# Patient Record
Sex: FEMALE | Race: WHITE | NOT HISPANIC OR LATINO | Employment: OTHER | URBAN - METROPOLITAN AREA
[De-identification: names, ages, dates, MRNs, and addresses within clinical notes are randomized per-mention and may not be internally consistent; named-entity substitution may affect disease eponyms.]

---

## 2017-04-28 ENCOUNTER — HOSPITAL ENCOUNTER (EMERGENCY)
Facility: HOSPITAL | Age: 67
Discharge: HOME | End: 2017-04-28
Attending: EMERGENCY MEDICINE | Admitting: EMERGENCY MEDICINE

## 2017-04-28 ENCOUNTER — APPOINTMENT (OUTPATIENT)
Dept: CT IMAGING | Facility: HOSPITAL | Age: 67
End: 2017-04-28

## 2017-04-28 ENCOUNTER — APPOINTMENT (OUTPATIENT)
Dept: GENERAL RADIOLOGY | Facility: HOSPITAL | Age: 67
End: 2017-04-28

## 2017-04-28 VITALS
BODY MASS INDEX: 25.4 KG/M2 | DIASTOLIC BLOOD PRESSURE: 93 MMHG | OXYGEN SATURATION: 100 % | HEIGHT: 62 IN | WEIGHT: 138 LBS | HEART RATE: 88 BPM | TEMPERATURE: 97.8 F | RESPIRATION RATE: 16 BRPM | SYSTOLIC BLOOD PRESSURE: 128 MMHG

## 2017-04-28 DIAGNOSIS — R42 DIZZINESS: Primary | ICD-10-CM

## 2017-04-28 DIAGNOSIS — E63.9 POOR NUTRITION: ICD-10-CM

## 2017-04-28 DIAGNOSIS — E86.0 DEHYDRATION: ICD-10-CM

## 2017-04-28 LAB
ALBUMIN SERPL-MCNC: 4.4 G/DL (ref 3.2–4.8)
ALBUMIN/GLOB SERPL: 1.3 G/DL (ref 1.5–2.5)
ALP SERPL-CCNC: 82 U/L (ref 25–100)
ALT SERPL W P-5'-P-CCNC: 13 U/L (ref 7–40)
AMPHET+METHAMPHET UR QL: NEGATIVE
AMPHETAMINES UR QL: NEGATIVE
ANION GAP SERPL CALCULATED.3IONS-SCNC: 14 MMOL/L (ref 3–11)
AST SERPL-CCNC: 15 U/L (ref 0–33)
BACTERIA UR QL AUTO: ABNORMAL /HPF
BARBITURATES UR QL SCN: NEGATIVE
BASOPHILS # BLD AUTO: 0.05 10*3/MM3 (ref 0–0.2)
BASOPHILS NFR BLD AUTO: 0.4 % (ref 0–1)
BENZODIAZ UR QL SCN: NEGATIVE
BILIRUB SERPL-MCNC: 0.2 MG/DL (ref 0.3–1.2)
BILIRUB UR QL STRIP: NEGATIVE
BUN BLD-MCNC: 113 MG/DL (ref 9–23)
BUN/CREAT SERPL: 75.3 (ref 7–25)
BUPRENORPHINE SERPL-MCNC: NEGATIVE NG/ML
CALCIUM SPEC-SCNC: 9.2 MG/DL (ref 8.7–10.4)
CANNABINOIDS SERPL QL: NEGATIVE
CHLORIDE SERPL-SCNC: 108 MMOL/L (ref 99–109)
CLARITY UR: ABNORMAL
CO2 SERPL-SCNC: 15 MMOL/L (ref 20–31)
COCAINE UR QL: NEGATIVE
COLOR UR: YELLOW
CREAT BLD-MCNC: 1.5 MG/DL (ref 0.6–1.3)
DEPRECATED RDW RBC AUTO: 46.1 FL (ref 37–54)
EOSINOPHIL # BLD AUTO: 0.12 10*3/MM3 (ref 0.1–0.3)
EOSINOPHIL NFR BLD AUTO: 0.9 % (ref 0–3)
ERYTHROCYTE [DISTWIDTH] IN BLOOD BY AUTOMATED COUNT: 13.4 % (ref 11.3–14.5)
ETHANOL BLD-MCNC: <10 MG/DL (ref 0–10)
GFR SERPL CREATININE-BSD FRML MDRD: 34 ML/MIN/1.73
GLOBULIN UR ELPH-MCNC: 3.4 GM/DL
GLUCOSE BLD-MCNC: 131 MG/DL (ref 70–100)
GLUCOSE UR STRIP-MCNC: NEGATIVE MG/DL
HCT VFR BLD AUTO: 45.1 % (ref 34.5–44)
HGB BLD-MCNC: 16 G/DL (ref 11.5–15.5)
HGB UR QL STRIP.AUTO: NEGATIVE
HOLD SPECIMEN: NORMAL
HOLD SPECIMEN: NORMAL
HYALINE CASTS UR QL AUTO: ABNORMAL /LPF
IMM GRANULOCYTES # BLD: 0.07 10*3/MM3 (ref 0–0.03)
IMM GRANULOCYTES NFR BLD: 0.5 % (ref 0–0.6)
KETONES UR QL STRIP: NEGATIVE
LEUKOCYTE ESTERASE UR QL STRIP.AUTO: NEGATIVE
LYMPHOCYTES # BLD AUTO: 1.52 10*3/MM3 (ref 0.6–4.8)
LYMPHOCYTES NFR BLD AUTO: 11 % (ref 24–44)
MAGNESIUM SERPL-MCNC: 2.4 MG/DL (ref 1.3–2.7)
MCH RBC QN AUTO: 33.1 PG (ref 27–31)
MCHC RBC AUTO-ENTMCNC: 35.5 G/DL (ref 32–36)
MCV RBC AUTO: 93.2 FL (ref 80–99)
METHADONE UR QL SCN: NEGATIVE
MONOCYTES # BLD AUTO: 1.09 10*3/MM3 (ref 0–1)
MONOCYTES NFR BLD AUTO: 7.9 % (ref 0–12)
NEUTROPHILS # BLD AUTO: 10.99 10*3/MM3 (ref 1.5–8.3)
NEUTROPHILS NFR BLD AUTO: 79.3 % (ref 41–71)
NITRITE UR QL STRIP: NEGATIVE
OPIATES UR QL: NEGATIVE
OXYCODONE UR QL SCN: NEGATIVE
PCP UR QL SCN: NEGATIVE
PH UR STRIP.AUTO: 6 [PH] (ref 5–8)
PLATELET # BLD AUTO: 374 10*3/MM3 (ref 150–450)
PMV BLD AUTO: 9.5 FL (ref 6–12)
POTASSIUM BLD-SCNC: 3 MMOL/L (ref 3.5–5.5)
PROPOXYPH UR QL: NEGATIVE
PROT SERPL-MCNC: 7.8 G/DL (ref 5.7–8.2)
PROT UR QL STRIP: NEGATIVE
RBC # BLD AUTO: 4.84 10*6/MM3 (ref 3.89–5.14)
RBC # UR: ABNORMAL /HPF
REF LAB TEST METHOD: ABNORMAL
SODIUM BLD-SCNC: 137 MMOL/L (ref 132–146)
SP GR UR STRIP: 1.02 (ref 1–1.03)
SQUAMOUS #/AREA URNS HPF: ABNORMAL /HPF
T4 FREE SERPL-MCNC: 0.76 NG/DL (ref 0.89–1.76)
TRICYCLICS UR QL SCN: NEGATIVE
TROPONIN I SERPL-MCNC: 0 NG/ML (ref 0–0.07)
TSH SERPL DL<=0.05 MIU/L-ACNC: 2.02 MIU/ML (ref 0.35–5.35)
UROBILINOGEN UR QL STRIP: ABNORMAL
VIT B12 BLD-MCNC: 370 PG/ML (ref 211–911)
WBC NRBC COR # BLD: 13.84 10*3/MM3 (ref 3.5–10.8)
WBC UR QL AUTO: ABNORMAL /HPF
WHOLE BLOOD HOLD SPECIMEN: NORMAL
WHOLE BLOOD HOLD SPECIMEN: NORMAL

## 2017-04-28 PROCEDURE — 80053 COMPREHEN METABOLIC PANEL: CPT | Performed by: EMERGENCY MEDICINE

## 2017-04-28 PROCEDURE — 80306 DRUG TEST PRSMV INSTRMNT: CPT | Performed by: EMERGENCY MEDICINE

## 2017-04-28 PROCEDURE — 96365 THER/PROPH/DIAG IV INF INIT: CPT

## 2017-04-28 PROCEDURE — 93005 ELECTROCARDIOGRAM TRACING: CPT | Performed by: EMERGENCY MEDICINE

## 2017-04-28 PROCEDURE — 82607 VITAMIN B-12: CPT | Performed by: EMERGENCY MEDICINE

## 2017-04-28 PROCEDURE — 80307 DRUG TEST PRSMV CHEM ANLYZR: CPT | Performed by: EMERGENCY MEDICINE

## 2017-04-28 PROCEDURE — 87086 URINE CULTURE/COLONY COUNT: CPT | Performed by: EMERGENCY MEDICINE

## 2017-04-28 PROCEDURE — 84484 ASSAY OF TROPONIN QUANT: CPT

## 2017-04-28 PROCEDURE — 25010000002 THIAMINE PER 100 MG: Performed by: EMERGENCY MEDICINE

## 2017-04-28 PROCEDURE — 84439 ASSAY OF FREE THYROXINE: CPT | Performed by: EMERGENCY MEDICINE

## 2017-04-28 PROCEDURE — 96361 HYDRATE IV INFUSION ADD-ON: CPT

## 2017-04-28 PROCEDURE — 83735 ASSAY OF MAGNESIUM: CPT | Performed by: EMERGENCY MEDICINE

## 2017-04-28 PROCEDURE — 85025 COMPLETE CBC W/AUTO DIFF WBC: CPT | Performed by: EMERGENCY MEDICINE

## 2017-04-28 PROCEDURE — 70450 CT HEAD/BRAIN W/O DYE: CPT

## 2017-04-28 PROCEDURE — 71010 HC CHEST PA OR AP: CPT

## 2017-04-28 PROCEDURE — 99285 EMERGENCY DEPT VISIT HI MDM: CPT

## 2017-04-28 PROCEDURE — 81001 URINALYSIS AUTO W/SCOPE: CPT | Performed by: EMERGENCY MEDICINE

## 2017-04-28 PROCEDURE — 84443 ASSAY THYROID STIM HORMONE: CPT | Performed by: EMERGENCY MEDICINE

## 2017-04-28 RX ORDER — LISINOPRIL 20 MG/1
20 TABLET ORAL DAILY
Status: ON HOLD | COMMUNITY
End: 2019-08-08

## 2017-04-28 RX ORDER — POTASSIUM CHLORIDE 1.5 G/1.77G
40 POWDER, FOR SOLUTION ORAL ONCE
Status: COMPLETED | OUTPATIENT
Start: 2017-04-28 | End: 2017-04-28

## 2017-04-28 RX ORDER — SODIUM CHLORIDE 0.9 % (FLUSH) 0.9 %
10 SYRINGE (ML) INJECTION AS NEEDED
Status: DISCONTINUED | OUTPATIENT
Start: 2017-04-28 | End: 2017-04-28 | Stop reason: HOSPADM

## 2017-04-28 RX ORDER — RISPERIDONE 1 MG/1
1 TABLET, ORALLY DISINTEGRATING ORAL DAILY
Status: ON HOLD | COMMUNITY
End: 2019-08-08

## 2017-04-28 RX ORDER — THIAMINE HYDROCHLORIDE 100 MG/ML
100 INJECTION, SOLUTION INTRAMUSCULAR; INTRAVENOUS ONCE
Status: DISCONTINUED | OUTPATIENT
Start: 2017-04-28 | End: 2017-04-28

## 2017-04-28 RX ORDER — BUSPIRONE HYDROCHLORIDE 10 MG/1
10 TABLET ORAL 2 TIMES DAILY
Status: ON HOLD | COMMUNITY
End: 2019-08-08

## 2017-04-28 RX ADMIN — POTASSIUM CHLORIDE 40 MEQ: 1.5 POWDER, FOR SOLUTION ORAL at 14:58

## 2017-04-28 RX ADMIN — SODIUM CHLORIDE 1000 ML: 9 INJECTION, SOLUTION INTRAVENOUS at 13:18

## 2017-04-28 RX ADMIN — THIAMINE HYDROCHLORIDE 100 MG: 100 INJECTION, SOLUTION INTRAMUSCULAR; INTRAVENOUS at 14:12

## 2017-04-28 RX ADMIN — SODIUM CHLORIDE 1000 ML: 9 INJECTION, SOLUTION INTRAVENOUS at 14:49

## 2017-04-30 LAB — BACTERIA SPEC AEROBE CULT: NORMAL

## 2017-07-06 ENCOUNTER — HOSPITAL ENCOUNTER (EMERGENCY)
Facility: HOSPITAL | Age: 67
Discharge: HOME | End: 2017-07-07
Attending: EMERGENCY MEDICINE | Admitting: EMERGENCY MEDICINE

## 2017-07-06 ENCOUNTER — APPOINTMENT (OUTPATIENT)
Dept: GENERAL RADIOLOGY | Facility: HOSPITAL | Age: 67
End: 2017-07-06

## 2017-07-06 DIAGNOSIS — N28.9 RENAL INSUFFICIENCY: ICD-10-CM

## 2017-07-06 DIAGNOSIS — D72.829 LEUKOCYTOSIS, UNSPECIFIED TYPE: ICD-10-CM

## 2017-07-06 DIAGNOSIS — E86.0 DEHYDRATION: Primary | ICD-10-CM

## 2017-07-06 DIAGNOSIS — K52.9 GASTROENTERITIS: ICD-10-CM

## 2017-07-06 DIAGNOSIS — E87.6 HYPOKALEMIA: ICD-10-CM

## 2017-07-06 LAB
ALBUMIN SERPL-MCNC: 4.5 G/DL (ref 3.2–4.8)
ALBUMIN/GLOB SERPL: 1.4 G/DL (ref 1.5–2.5)
ALP SERPL-CCNC: 76 U/L (ref 25–100)
ALT SERPL W P-5'-P-CCNC: 7 U/L (ref 7–40)
ANION GAP SERPL CALCULATED.3IONS-SCNC: 11 MMOL/L (ref 3–11)
AST SERPL-CCNC: 11 U/L (ref 0–33)
BACTERIA UR QL AUTO: ABNORMAL /HPF
BASOPHILS # BLD AUTO: 0.07 10*3/MM3 (ref 0–0.2)
BASOPHILS NFR BLD AUTO: 0.4 % (ref 0–1)
BILIRUB SERPL-MCNC: 0.3 MG/DL (ref 0.3–1.2)
BILIRUB UR QL STRIP: NEGATIVE
BILIRUB UR QL STRIP: NEGATIVE
BUN BLD-MCNC: 44 MG/DL (ref 9–23)
BUN/CREAT SERPL: 23.2 (ref 7–25)
CALCIUM SPEC-SCNC: 11.4 MG/DL (ref 8.7–10.4)
CHLORIDE SERPL-SCNC: 104 MMOL/L (ref 99–109)
CLARITY UR: CLEAR
CLARITY UR: CLEAR
CO2 SERPL-SCNC: 17 MMOL/L (ref 20–31)
COLOR UR: YELLOW
COLOR UR: YELLOW
CREAT BLD-MCNC: 1.9 MG/DL (ref 0.6–1.3)
DEPRECATED RDW RBC AUTO: 43.9 FL (ref 37–54)
EOSINOPHIL # BLD AUTO: 0.25 10*3/MM3 (ref 0–0.3)
EOSINOPHIL NFR BLD AUTO: 1.5 % (ref 0–3)
ERYTHROCYTE [DISTWIDTH] IN BLOOD BY AUTOMATED COUNT: 13.5 % (ref 11.3–14.5)
GFR SERPL CREATININE-BSD FRML MDRD: 26 ML/MIN/1.73
GLOBULIN UR ELPH-MCNC: 3.3 GM/DL
GLUCOSE BLD-MCNC: 112 MG/DL (ref 70–100)
GLUCOSE UR STRIP-MCNC: NEGATIVE MG/DL
GLUCOSE UR STRIP-MCNC: NEGATIVE MG/DL
HCT VFR BLD AUTO: 37.6 % (ref 34.5–44)
HGB BLD-MCNC: 13.6 G/DL (ref 11.5–15.5)
HGB UR QL STRIP.AUTO: NEGATIVE
HGB UR QL STRIP.AUTO: NEGATIVE
HOLD SPECIMEN: NORMAL
HOLD SPECIMEN: NORMAL
HYALINE CASTS UR QL AUTO: ABNORMAL /LPF
IMM GRANULOCYTES # BLD: 0.11 10*3/MM3 (ref 0–0.03)
IMM GRANULOCYTES NFR BLD: 0.7 % (ref 0–0.6)
KETONES UR QL STRIP: NEGATIVE
KETONES UR QL STRIP: NEGATIVE
LEUKOCYTE ESTERASE UR QL STRIP.AUTO: ABNORMAL
LEUKOCYTE ESTERASE UR QL STRIP.AUTO: NEGATIVE
LIPASE SERPL-CCNC: 49 U/L (ref 6–51)
LYMPHOCYTES # BLD AUTO: 3.06 10*3/MM3 (ref 0.6–4.8)
LYMPHOCYTES NFR BLD AUTO: 18.2 % (ref 24–44)
MAGNESIUM SERPL-MCNC: 2.2 MG/DL (ref 1.3–2.7)
MCH RBC QN AUTO: 32 PG (ref 27–31)
MCHC RBC AUTO-ENTMCNC: 36.2 G/DL (ref 32–36)
MCV RBC AUTO: 88.5 FL (ref 80–99)
MONOCYTES # BLD AUTO: 1.43 10*3/MM3 (ref 0–1)
MONOCYTES NFR BLD AUTO: 8.5 % (ref 0–12)
NEUTROPHILS # BLD AUTO: 11.9 10*3/MM3 (ref 1.5–8.3)
NEUTROPHILS NFR BLD AUTO: 70.7 % (ref 41–71)
NITRITE UR QL STRIP: NEGATIVE
NITRITE UR QL STRIP: NEGATIVE
PH UR STRIP.AUTO: 6 [PH] (ref 5–8)
PH UR STRIP.AUTO: 6 [PH] (ref 5–8)
PLATELET # BLD AUTO: 430 10*3/MM3 (ref 150–450)
PMV BLD AUTO: 9.8 FL (ref 6–12)
POTASSIUM BLD-SCNC: 3.2 MMOL/L (ref 3.5–5.5)
PROT SERPL-MCNC: 7.8 G/DL (ref 5.7–8.2)
PROT UR QL STRIP: ABNORMAL
PROT UR QL STRIP: NEGATIVE
RBC # BLD AUTO: 4.25 10*6/MM3 (ref 3.89–5.14)
RBC # UR: ABNORMAL /HPF
REF LAB TEST METHOD: ABNORMAL
SODIUM BLD-SCNC: 132 MMOL/L (ref 132–146)
SP GR UR STRIP: 1.01 (ref 1–1.03)
SP GR UR STRIP: 1.02 (ref 1–1.03)
SQUAMOUS #/AREA URNS HPF: ABNORMAL /HPF
UROBILINOGEN UR QL STRIP: ABNORMAL
UROBILINOGEN UR QL STRIP: NORMAL
WBC NRBC COR # BLD: 16.82 10*3/MM3 (ref 3.5–10.8)
WBC UR QL AUTO: ABNORMAL /HPF
WHOLE BLOOD HOLD SPECIMEN: NORMAL
WHOLE BLOOD HOLD SPECIMEN: NORMAL

## 2017-07-06 PROCEDURE — 71020 HC CHEST PA AND LATERAL: CPT

## 2017-07-06 PROCEDURE — 25010000002 ONDANSETRON PER 1 MG: Performed by: EMERGENCY MEDICINE

## 2017-07-06 PROCEDURE — 83735 ASSAY OF MAGNESIUM: CPT | Performed by: EMERGENCY MEDICINE

## 2017-07-06 PROCEDURE — 96374 THER/PROPH/DIAG INJ IV PUSH: CPT

## 2017-07-06 PROCEDURE — 80053 COMPREHEN METABOLIC PANEL: CPT | Performed by: EMERGENCY MEDICINE

## 2017-07-06 PROCEDURE — 85025 COMPLETE CBC W/AUTO DIFF WBC: CPT | Performed by: EMERGENCY MEDICINE

## 2017-07-06 PROCEDURE — 81001 URINALYSIS AUTO W/SCOPE: CPT | Performed by: EMERGENCY MEDICINE

## 2017-07-06 PROCEDURE — 87086 URINE CULTURE/COLONY COUNT: CPT | Performed by: EMERGENCY MEDICINE

## 2017-07-06 PROCEDURE — 99283 EMERGENCY DEPT VISIT LOW MDM: CPT

## 2017-07-06 PROCEDURE — 93005 ELECTROCARDIOGRAM TRACING: CPT | Performed by: EMERGENCY MEDICINE

## 2017-07-06 PROCEDURE — 83690 ASSAY OF LIPASE: CPT | Performed by: EMERGENCY MEDICINE

## 2017-07-06 PROCEDURE — 81003 URINALYSIS AUTO W/O SCOPE: CPT | Performed by: EMERGENCY MEDICINE

## 2017-07-06 PROCEDURE — 96361 HYDRATE IV INFUSION ADD-ON: CPT

## 2017-07-06 RX ORDER — METOPROLOL SUCCINATE 25 MG/1
25 TABLET, EXTENDED RELEASE ORAL DAILY
Status: ON HOLD | COMMUNITY
End: 2019-08-08

## 2017-07-06 RX ORDER — ONDANSETRON 2 MG/ML
4 INJECTION INTRAMUSCULAR; INTRAVENOUS ONCE
Status: COMPLETED | OUTPATIENT
Start: 2017-07-06 | End: 2017-07-06

## 2017-07-06 RX ORDER — HYDROCHLOROTHIAZIDE 12.5 MG/1
12.5 TABLET ORAL DAILY
Status: ON HOLD | COMMUNITY
End: 2019-08-08

## 2017-07-06 RX ORDER — SODIUM CHLORIDE 9 MG/ML
125 INJECTION, SOLUTION INTRAVENOUS CONTINUOUS
Status: DISCONTINUED | OUTPATIENT
Start: 2017-07-06 | End: 2017-07-07 | Stop reason: HOSPADM

## 2017-07-06 RX ORDER — POTASSIUM CHLORIDE 750 MG/1
20 CAPSULE, EXTENDED RELEASE ORAL ONCE
Status: COMPLETED | OUTPATIENT
Start: 2017-07-06 | End: 2017-07-06

## 2017-07-06 RX ORDER — SODIUM CHLORIDE 0.9 % (FLUSH) 0.9 %
10 SYRINGE (ML) INJECTION AS NEEDED
Status: DISCONTINUED | OUTPATIENT
Start: 2017-07-06 | End: 2017-07-07 | Stop reason: HOSPADM

## 2017-07-06 RX ORDER — TRAZODONE HYDROCHLORIDE 50 MG/1
50 TABLET ORAL NIGHTLY
Status: ON HOLD | COMMUNITY
End: 2019-08-08

## 2017-07-06 RX ADMIN — SODIUM CHLORIDE 1000 ML: 9 INJECTION, SOLUTION INTRAVENOUS at 22:28

## 2017-07-06 RX ADMIN — SODIUM CHLORIDE 1893 ML: 9 INJECTION, SOLUTION INTRAVENOUS at 20:16

## 2017-07-06 RX ADMIN — POTASSIUM CHLORIDE 20 MEQ: 750 CAPSULE, EXTENDED RELEASE ORAL at 20:27

## 2017-07-06 RX ADMIN — ONDANSETRON 4 MG: 2 INJECTION INTRAMUSCULAR; INTRAVENOUS at 20:17

## 2017-07-06 RX ADMIN — SODIUM CHLORIDE 125 ML/HR: 9 INJECTION, SOLUTION INTRAVENOUS at 22:10

## 2017-07-06 NOTE — ED PROVIDER NOTES
Subjective   HPI Comments: Ms. Natasha Flores is a 67 y.o. female who presents to the ED with c/o vomiting. She reports that for the last 4 days she has been having vomiting, diarrhea, and nausea. She then called her PCP who told her it may be due to dehydration and also that her blood work from last week showed an elevated creatine. She also complains of hypotension and dizziness. She denies a fever, chills, cough, SoA, abdominal pain, CP, dysuria, frequency and urgency. Her daughters note that she does not eat or drink well and drinks a lot of coffee. She is a smoker but does not drink alcohol. No other acute complaints at this time.    Patient is a 67 y.o. female presenting with vomiting.   History provided by:  Relative and patient  Vomiting   The primary symptoms include nausea, vomiting and diarrhea. Primary symptoms do not include fever, abdominal pain, hematemesis, hematochezia or dysuria. The illness began 3 to 5 days ago. The onset was gradual. The problem has not changed since onset.  The illness does not include chills.       Review of Systems   Constitutional: Negative for chills and fever.   HENT: Negative for congestion.    Respiratory: Negative for cough.    Cardiovascular: Negative for chest pain.   Gastrointestinal: Positive for diarrhea, nausea and vomiting. Negative for abdominal pain, blood in stool, hematemesis and hematochezia.   Genitourinary: Negative for dysuria, frequency, hematuria and urgency.   All other systems reviewed and are negative.      Past Medical History:   Diagnosis Date   • Anxiety    • Hypertension        Allergies   Allergen Reactions   • Penicillins        Past Surgical History:   Procedure Laterality Date   •  SECTION         History reviewed. No pertinent family history.    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 She appears well-developed and well-nourished. No distress.   HENT:   Head: Normocephalic and atraumatic.   Mouth/Throat: Uvula is midline and oropharynx is clear and moist. Mucous membranes are dry.   Eyes: Conjunctivae are normal. No scleral icterus.   Neck: Normal range of motion. Neck supple.   Cardiovascular: Normal rate, regular rhythm and normal heart sounds.  Exam reveals no gallop and no friction rub.    No murmur heard.  Pulmonary/Chest: Effort normal and breath sounds normal. No respiratory distress. She has no wheezes. She has no rales.   Abdominal: Soft. She exhibits no mass. There is no tenderness. There is no rebound and no guarding.   Musculoskeletal: Normal range of motion. She exhibits no edema, tenderness or deformity.   Lymphadenopathy:     She has no cervical adenopathy.   Neurological: She is alert and oriented to person, place, and time.   Neurologically intact.   Skin: Skin is warm and dry.   Psychiatric: She has a normal mood and affect. Her behavior is normal.   Nursing note and vitals reviewed.      Procedures         ED Course  ED Course   Comment By Time   Patient is serially reevaluated throughout the ED course with last reevaluation now.  She is alert and happy and feels that she very much once to go home right now.  She is able to drink fluids quite adequately in the ED.  She ate from a lunch box, not a great amount but a moderate amount.  She was able to provide a stool which is sent for culture as wellI reviewed her labs and her home meds as well.  She is on hydrochlorothiazide for her swollen ankles,  lisinopril in fairly significant doses, and she may actually be taking this twice a day instead of once, 20 mg per dose, and metoprolol.  She is held her blood pressure medicines because of her low blood pressure over the last 2 days.I discussed discontinuing the hydrochlorothiazide.  I've asked her to hold her lisinopril unless Dr. Ca instructs her to restart this.  I'll have her take a dose of metoprolol tonight and 25 mg which is the dose that she was cut down to with her declining blood pressures.  Her last blood pressures on automated cuff which typically over reads in the ED has been 150 and 160.I discussed drinking 8 glasses of fluid a day.  I discussed early follow-up with laboratory reevaluation in view of her creatinine elevation again today.  I discussed her leukocytosis.  We've treated with oral potassium in the EDRepeat urinalysis is negative, initial one look contaminatedPatient's abdomen is never been tender and without any significant peritoneal findings.Very pleasant patient and supportive family verbalizes understanding and agreement with the plan of care. Ethan Colunga MD 07/07 0122   The patient was vigorously rehydrated in the emergency department.  She received 2 L of IV bolus fluids.  She had continued IV fluids infusion and oral fluids as well. Ethan Colunga MD 07/07 0129                     MDM    Final diagnoses:   Dehydration   Gastroenteritis   Leukocytosis, unspecified type   Renal insufficiency   Hypokalemia       Documentation assistance provided by allen Porter.  Information recorded by the scribe was done at my direction and has been verified and validated by me.     Sara Porter  07/06/17 2006       Ethan Colunga MD  07/07/17 0129

## 2017-07-07 VITALS
RESPIRATION RATE: 14 BRPM | HEART RATE: 80 BPM | TEMPERATURE: 97.6 F | WEIGHT: 139 LBS | DIASTOLIC BLOOD PRESSURE: 93 MMHG | SYSTOLIC BLOOD PRESSURE: 161 MMHG | OXYGEN SATURATION: 99 % | BODY MASS INDEX: 25.58 KG/M2 | HEIGHT: 62 IN

## 2017-07-07 PROCEDURE — 87046 STOOL CULTR AEROBIC BACT EA: CPT | Performed by: EMERGENCY MEDICINE

## 2017-07-07 PROCEDURE — 87045 FECES CULTURE AEROBIC BACT: CPT | Performed by: EMERGENCY MEDICINE

## 2017-07-07 RX ORDER — ONDANSETRON 8 MG/1
8 TABLET, ORALLY DISINTEGRATING ORAL EVERY 8 HOURS PRN
Qty: 10 TABLET | Refills: 0 | Status: ON HOLD | OUTPATIENT
Start: 2017-07-07 | End: 2019-08-08

## 2017-07-07 NOTE — DISCHARGE INSTRUCTIONS
Rest and drink plenty of fluids.  Drink 8 large glasses of water per day, or combination of the rehydration solution like a Gatorade and water along with other electrolyte-containing liquids as discussed    Continue your metoprolol.  Stop your hydrochlorothiazide.  Hold your lisinopril and discuss when it's appropriate and safe to restart your lisinopril with Dr. Ca.  Discussed this visit, and your medications tomorrow with Dr. Ca.  This is very important in view of your elevated creatinine level.    Prompt return to the emergency department if you have worse symptoms, become dehydrated, have increasing weakness, or any other acute, urgent, or emergent symptoms as discussed    You absolutely need your laboratories rechecked in the office with Dr. Ca at the beginning of the week, more promptly if required.

## 2017-07-08 LAB
BACTERIA SPEC AEROBE CULT: NORMAL
BACTERIA SPEC AEROBE CULT: NORMAL

## 2019-08-07 ENCOUNTER — APPOINTMENT (OUTPATIENT)
Dept: GENERAL RADIOLOGY | Facility: HOSPITAL | Age: 69
End: 2019-08-07

## 2019-08-07 ENCOUNTER — APPOINTMENT (OUTPATIENT)
Dept: MRI IMAGING | Facility: HOSPITAL | Age: 69
End: 2019-08-07

## 2019-08-07 ENCOUNTER — HOSPITAL ENCOUNTER (INPATIENT)
Facility: HOSPITAL | Age: 69
LOS: 5 days | Discharge: NURSING, REHAB OR LONG-TERM CARE | End: 2019-08-12
Attending: EMERGENCY MEDICINE | Admitting: INTERNAL MEDICINE

## 2019-08-07 ENCOUNTER — APPOINTMENT (OUTPATIENT)
Dept: CT IMAGING | Facility: HOSPITAL | Age: 69
End: 2019-08-07

## 2019-08-07 DIAGNOSIS — Z74.09 IMPAIRED MOBILITY AND ADLS: ICD-10-CM

## 2019-08-07 DIAGNOSIS — I63.9 CEREBELLAR STROKE (HCC): Primary | ICD-10-CM

## 2019-08-07 DIAGNOSIS — Z78.9 IMPAIRED MOBILITY AND ADLS: ICD-10-CM

## 2019-08-07 DIAGNOSIS — I10 ESSENTIAL HYPERTENSION: ICD-10-CM

## 2019-08-07 DIAGNOSIS — I70.90 ATHEROSCLEROTIC OCCLUSIVE DISEASE: ICD-10-CM

## 2019-08-07 PROBLEM — F32.A DEPRESSION: Status: ACTIVE | Noted: 2019-08-07

## 2019-08-07 PROBLEM — F10.21 HISTORY OF ALCOHOL DEPENDENCE (HCC): Status: ACTIVE | Noted: 2019-08-07

## 2019-08-07 PROBLEM — D72.829 LEUKOCYTOSIS: Status: ACTIVE | Noted: 2019-08-07

## 2019-08-07 PROBLEM — F41.1 GENERALIZED ANXIETY DISORDER: Status: ACTIVE | Noted: 2019-08-07

## 2019-08-07 PROBLEM — Z87.891 HISTORY OF TOBACCO ABUSE: Status: ACTIVE | Noted: 2019-08-07

## 2019-08-07 PROBLEM — I73.9 PAD (PERIPHERAL ARTERY DISEASE) (HCC): Status: ACTIVE | Noted: 2019-08-07

## 2019-08-07 LAB
ALBUMIN SERPL-MCNC: 4.6 G/DL (ref 3.5–5.2)
ALBUMIN/GLOB SERPL: 1.2 G/DL
ALP SERPL-CCNC: 69 U/L (ref 39–117)
ALT SERPL W P-5'-P-CCNC: 9 U/L (ref 1–33)
ANION GAP SERPL CALCULATED.3IONS-SCNC: 13 MMOL/L (ref 5–15)
AST SERPL-CCNC: 17 U/L (ref 1–32)
BASOPHILS # BLD AUTO: 0.08 10*3/MM3 (ref 0–0.2)
BASOPHILS NFR BLD AUTO: 0.5 % (ref 0–1.5)
BILIRUB SERPL-MCNC: 0.3 MG/DL (ref 0.2–1.2)
BILIRUB UR QL STRIP: NEGATIVE
BUN BLD-MCNC: 22 MG/DL (ref 8–23)
BUN/CREAT SERPL: 25.9 (ref 7–25)
CALCIUM SPEC-SCNC: 9.9 MG/DL (ref 8.6–10.5)
CHLORIDE SERPL-SCNC: 104 MMOL/L (ref 98–107)
CLARITY UR: CLEAR
CO2 SERPL-SCNC: 22 MMOL/L (ref 22–29)
COLOR UR: YELLOW
CREAT BLD-MCNC: 0.85 MG/DL (ref 0.57–1)
D-LACTATE SERPL-SCNC: 1.4 MMOL/L (ref 0.5–2)
DEPRECATED RDW RBC AUTO: 47 FL (ref 37–54)
EOSINOPHIL # BLD AUTO: 0.11 10*3/MM3 (ref 0–0.4)
EOSINOPHIL NFR BLD AUTO: 0.7 % (ref 0.3–6.2)
ERYTHROCYTE [DISTWIDTH] IN BLOOD BY AUTOMATED COUNT: 13.2 % (ref 12.3–15.4)
GFR SERPL CREATININE-BSD FRML MDRD: 66 ML/MIN/1.73
GLOBULIN UR ELPH-MCNC: 3.7 GM/DL
GLUCOSE BLD-MCNC: 129 MG/DL (ref 65–99)
GLUCOSE BLDC GLUCOMTR-MCNC: 128 MG/DL (ref 70–130)
GLUCOSE UR STRIP-MCNC: NEGATIVE MG/DL
HCT VFR BLD AUTO: 41.3 % (ref 34–46.6)
HGB BLD-MCNC: 13.4 G/DL (ref 12–15.9)
HGB UR QL STRIP.AUTO: NEGATIVE
HOLD SPECIMEN: NORMAL
HOLD SPECIMEN: NORMAL
IMM GRANULOCYTES # BLD AUTO: 0.08 10*3/MM3 (ref 0–0.05)
IMM GRANULOCYTES NFR BLD AUTO: 0.5 % (ref 0–0.5)
INR PPP: 0.98 (ref 0.85–1.16)
KETONES UR QL STRIP: NEGATIVE
LEUKOCYTE ESTERASE UR QL STRIP.AUTO: NEGATIVE
LIPASE SERPL-CCNC: 49 U/L (ref 13–60)
LYMPHOCYTES # BLD AUTO: 1.82 10*3/MM3 (ref 0.7–3.1)
LYMPHOCYTES NFR BLD AUTO: 11.6 % (ref 19.6–45.3)
MCH RBC QN AUTO: 31.5 PG (ref 26.6–33)
MCHC RBC AUTO-ENTMCNC: 32.4 G/DL (ref 31.5–35.7)
MCV RBC AUTO: 96.9 FL (ref 79–97)
MONOCYTES # BLD AUTO: 0.68 10*3/MM3 (ref 0.1–0.9)
MONOCYTES NFR BLD AUTO: 4.3 % (ref 5–12)
NEUTROPHILS # BLD AUTO: 12.96 10*3/MM3 (ref 1.7–7)
NEUTROPHILS NFR BLD AUTO: 82.4 % (ref 42.7–76)
NITRITE UR QL STRIP: NEGATIVE
NRBC BLD AUTO-RTO: 0 /100 WBC (ref 0–0.2)
PA ADP PRP-ACNC: 202 PRU
PH UR STRIP.AUTO: 6 [PH] (ref 5–8)
PLATELET # BLD AUTO: 338 10*3/MM3 (ref 140–450)
PMV BLD AUTO: 9.4 FL (ref 6–12)
POTASSIUM BLD-SCNC: 4.4 MMOL/L (ref 3.5–5.2)
PROT SERPL-MCNC: 8.3 G/DL (ref 6–8.5)
PROT UR QL STRIP: NEGATIVE
PROTHROMBIN TIME: 12.5 SECONDS (ref 11.2–14.3)
RBC # BLD AUTO: 4.26 10*6/MM3 (ref 3.77–5.28)
SODIUM BLD-SCNC: 139 MMOL/L (ref 136–145)
SP GR UR STRIP: 1.01 (ref 1–1.03)
UROBILINOGEN UR QL STRIP: NORMAL
WBC NRBC COR # BLD: 15.73 10*3/MM3 (ref 3.4–10.8)
WHOLE BLOOD HOLD SPECIMEN: NORMAL
WHOLE BLOOD HOLD SPECIMEN: NORMAL

## 2019-08-07 PROCEDURE — 80053 COMPREHEN METABOLIC PANEL: CPT | Performed by: EMERGENCY MEDICINE

## 2019-08-07 PROCEDURE — 70498 CT ANGIOGRAPHY NECK: CPT

## 2019-08-07 PROCEDURE — 82962 GLUCOSE BLOOD TEST: CPT

## 2019-08-07 PROCEDURE — B3251ZZ COMPUTERIZED TOMOGRAPHY (CT SCAN) OF BILATERAL COMMON CAROTID ARTERIES USING LOW OSMOLAR CONTRAST: ICD-10-PCS | Performed by: RADIOLOGY

## 2019-08-07 PROCEDURE — 25010000002 ONDANSETRON PER 1 MG: Performed by: PHYSICIAN ASSISTANT

## 2019-08-07 PROCEDURE — 81003 URINALYSIS AUTO W/O SCOPE: CPT | Performed by: EMERGENCY MEDICINE

## 2019-08-07 PROCEDURE — 70496 CT ANGIOGRAPHY HEAD: CPT

## 2019-08-07 PROCEDURE — 70551 MRI BRAIN STEM W/O DYE: CPT

## 2019-08-07 PROCEDURE — 99223 1ST HOSP IP/OBS HIGH 75: CPT | Performed by: FAMILY MEDICINE

## 2019-08-07 PROCEDURE — 71046 X-RAY EXAM CHEST 2 VIEWS: CPT

## 2019-08-07 PROCEDURE — 83690 ASSAY OF LIPASE: CPT | Performed by: EMERGENCY MEDICINE

## 2019-08-07 PROCEDURE — 85576 BLOOD PLATELET AGGREGATION: CPT | Performed by: PHYSICIAN ASSISTANT

## 2019-08-07 PROCEDURE — 85610 PROTHROMBIN TIME: CPT | Performed by: PHYSICIAN ASSISTANT

## 2019-08-07 PROCEDURE — 83605 ASSAY OF LACTIC ACID: CPT | Performed by: EMERGENCY MEDICINE

## 2019-08-07 PROCEDURE — 85025 COMPLETE CBC W/AUTO DIFF WBC: CPT | Performed by: EMERGENCY MEDICINE

## 2019-08-07 PROCEDURE — B32R1ZZ COMPUTERIZED TOMOGRAPHY (CT SCAN) OF INTRACRANIAL ARTERIES USING LOW OSMOLAR CONTRAST: ICD-10-PCS | Performed by: RADIOLOGY

## 2019-08-07 PROCEDURE — B32G1ZZ COMPUTERIZED TOMOGRAPHY (CT SCAN) OF BILATERAL VERTEBRAL ARTERIES USING LOW OSMOLAR CONTRAST: ICD-10-PCS | Performed by: RADIOLOGY

## 2019-08-07 PROCEDURE — 93005 ELECTROCARDIOGRAM TRACING: CPT | Performed by: EMERGENCY MEDICINE

## 2019-08-07 PROCEDURE — 0 IOPAMIDOL PER 1 ML: Performed by: EMERGENCY MEDICINE

## 2019-08-07 PROCEDURE — 99285 EMERGENCY DEPT VISIT HI MDM: CPT

## 2019-08-07 PROCEDURE — B3281ZZ COMPUTERIZED TOMOGRAPHY (CT SCAN) OF BILATERAL INTERNAL CAROTID ARTERIES USING LOW OSMOLAR CONTRAST: ICD-10-PCS | Performed by: RADIOLOGY

## 2019-08-07 RX ORDER — FOLIC ACID 1 MG/1
1 TABLET ORAL DAILY
COMMUNITY

## 2019-08-07 RX ORDER — AMLODIPINE BESYLATE 5 MG/1
5 TABLET ORAL DAILY
COMMUNITY

## 2019-08-07 RX ORDER — THIAMINE MONONITRATE (VIT B1) 100 MG
100 TABLET ORAL DAILY
Status: DISCONTINUED | OUTPATIENT
Start: 2019-08-08 | End: 2019-08-12 | Stop reason: HOSPADM

## 2019-08-07 RX ORDER — CLOPIDOGREL BISULFATE 75 MG/1
75 TABLET ORAL DAILY
COMMUNITY

## 2019-08-07 RX ORDER — SODIUM CHLORIDE 0.9 % (FLUSH) 0.9 %
10 SYRINGE (ML) INJECTION AS NEEDED
Status: DISCONTINUED | OUTPATIENT
Start: 2019-08-07 | End: 2019-08-12 | Stop reason: HOSPADM

## 2019-08-07 RX ORDER — LOSARTAN POTASSIUM 50 MG/1
100 TABLET ORAL DAILY
COMMUNITY

## 2019-08-07 RX ORDER — ASPIRIN 300 MG/1
300 SUPPOSITORY RECTAL DAILY
Status: DISCONTINUED | OUTPATIENT
Start: 2019-08-08 | End: 2019-08-12 | Stop reason: HOSPADM

## 2019-08-07 RX ORDER — LOSARTAN POTASSIUM 50 MG/1
100 TABLET ORAL DAILY
Status: DISCONTINUED | OUTPATIENT
Start: 2019-08-08 | End: 2019-08-12 | Stop reason: HOSPADM

## 2019-08-07 RX ORDER — SODIUM CHLORIDE 0.9 % (FLUSH) 0.9 %
3-10 SYRINGE (ML) INJECTION AS NEEDED
Status: DISCONTINUED | OUTPATIENT
Start: 2019-08-07 | End: 2019-08-12 | Stop reason: HOSPADM

## 2019-08-07 RX ORDER — FLUOXETINE HYDROCHLORIDE 20 MG/1
20 CAPSULE ORAL DAILY
Status: DISCONTINUED | OUTPATIENT
Start: 2019-08-08 | End: 2019-08-12 | Stop reason: HOSPADM

## 2019-08-07 RX ORDER — ONDANSETRON 2 MG/ML
4 INJECTION INTRAMUSCULAR; INTRAVENOUS ONCE
Status: COMPLETED | OUTPATIENT
Start: 2019-08-07 | End: 2019-08-07

## 2019-08-07 RX ORDER — ATORVASTATIN CALCIUM 40 MG/1
80 TABLET, FILM COATED ORAL NIGHTLY
Status: DISCONTINUED | OUTPATIENT
Start: 2019-08-08 | End: 2019-08-12 | Stop reason: HOSPADM

## 2019-08-07 RX ORDER — FLUOXETINE 10 MG/1
20 CAPSULE ORAL DAILY
COMMUNITY
End: 2020-09-02

## 2019-08-07 RX ORDER — ASPIRIN 325 MG
325 TABLET ORAL DAILY
Status: DISCONTINUED | OUTPATIENT
Start: 2019-08-08 | End: 2019-08-12 | Stop reason: HOSPADM

## 2019-08-07 RX ORDER — SODIUM CHLORIDE 0.9 % (FLUSH) 0.9 %
3 SYRINGE (ML) INJECTION EVERY 12 HOURS SCHEDULED
Status: DISCONTINUED | OUTPATIENT
Start: 2019-08-08 | End: 2019-08-12 | Stop reason: HOSPADM

## 2019-08-07 RX ORDER — AMLODIPINE BESYLATE 5 MG/1
5 TABLET ORAL DAILY
Status: DISCONTINUED | OUTPATIENT
Start: 2019-08-08 | End: 2019-08-12 | Stop reason: HOSPADM

## 2019-08-07 RX ORDER — CLOPIDOGREL BISULFATE 75 MG/1
75 TABLET ORAL DAILY
Status: DISCONTINUED | OUTPATIENT
Start: 2019-08-08 | End: 2019-08-12 | Stop reason: HOSPADM

## 2019-08-07 RX ORDER — FOLIC ACID 1 MG/1
1 TABLET ORAL DAILY
Status: DISCONTINUED | OUTPATIENT
Start: 2019-08-08 | End: 2019-08-12 | Stop reason: HOSPADM

## 2019-08-07 RX ADMIN — IOPAMIDOL 75 ML: 755 INJECTION, SOLUTION INTRAVENOUS at 19:20

## 2019-08-07 RX ADMIN — ONDANSETRON 4 MG: 2 INJECTION INTRAMUSCULAR; INTRAVENOUS at 19:11

## 2019-08-07 NOTE — ED PROVIDER NOTES
Subjective   69-year-old female presents emergency department with sudden onset dizziness nausea and vomiting around 3 PM today.  Patient felt suddenly nauseous as she was standing in her kitchen, stepped out onto the back porch and vomited, had episodic dizziness since then it seems to worsen with positional change.  No visual field cuts no blurred vision, no decreased hearing or tinnitus, she denies alterations of sense of taste or smell, no stiff neck, does complain of severe occipital headache radiating down the back of her neck, mild, nausea is currently minimal.  No unilateral weakness paresis paresthesias, no difficulty forming words or with speech.  No shortness of breath chest pain palpitations or irregular heartbeat.  She was hospitalized approximately one month ago at [facility] for bilateral femoral stents and 2 stents in her right lower extremity.  Primary care provider is Dr. Ca.            Review of Systems   Constitutional: Negative for activity change, appetite change, chills, diaphoresis, fatigue and fever.   HENT: Negative for congestion and sore throat.    Respiratory: Negative for cough, choking, chest tightness, shortness of breath and wheezing.    Cardiovascular: Negative for chest pain and leg swelling.   Gastrointestinal: Negative for abdominal distention, abdominal pain, anal bleeding, blood in stool, constipation, diarrhea, nausea and vomiting.   Genitourinary: Negative for difficulty urinating, dysuria, flank pain, frequency, hematuria and urgency.   Musculoskeletal: Negative for back pain, neck pain and neck stiffness.   Skin: Negative for pallor, rash and wound.   Neurological: Positive for dizziness and headaches. Negative for seizures, syncope, speech difficulty, weakness, light-headedness and numbness.   Psychiatric/Behavioral: Negative for confusion.   All other systems reviewed and are negative.      Past Medical History:   Diagnosis Date   • Anxiety    • Anxiety    • Depression     • Hypertension        Allergies   Allergen Reactions   • Penicillins        Past Surgical History:   Procedure Laterality Date   •  SECTION     • LEG SURGERY      STENT PLACEMENT         History reviewed. No pertinent family history.    Social History     Socioeconomic History   • Marital status: Unknown     Spouse name: Not on file   • Number of children: Not on file   • Years of education: Not on file   • Highest education level: Not on file   Tobacco Use   • Smoking status: Former Smoker     Packs/day: 1.00     Types: Cigarettes   • Smokeless tobacco: Never Used   • Tobacco comment: PT QUIT 1 MONTH AGO    Substance and Sexual Activity   • Alcohol use: No     Comment: PT REPORTS SHE STOPPED DRINKING 2 MONTHS AGO    • Drug use: No   • Sexual activity: Defer           Objective   Physical Exam   Constitutional: She is oriented to person, place, and time. She appears well-developed and well-nourished. No distress.   HENT:   Head: Normocephalic and atraumatic.   Right Ear: External ear normal.   Left Ear: External ear normal.   Nose: Nose normal.   Mouth/Throat: Oropharynx is clear and moist. No oropharyngeal exudate.   Eyes: Conjunctivae and EOM are normal. Pupils are equal, round, and reactive to light. Right eye exhibits no discharge. Left eye exhibits no discharge. No scleral icterus.   Visual fields are full, visual acuity is grossly intact.  She does have horizontal nystagmus with gaze to the right.   Neck: Normal range of motion. Neck supple. No JVD present. No tracheal deviation present. No thyromegaly present.   Cardiovascular: Normal rate, regular rhythm and intact distal pulses. Exam reveals no gallop and no friction rub.   No murmur heard.  Pulmonary/Chest: Effort normal and breath sounds normal. No stridor. No respiratory distress. She has no wheezes. She has no rales. She exhibits no tenderness.   Abdominal: Soft. Bowel sounds are normal. She exhibits no distension and no mass. There is no  tenderness. There is no rebound and no guarding.   Musculoskeletal: Normal range of motion. She exhibits no edema, tenderness or deformity.   Neurological: She is alert and oriented to person, place, and time. She displays normal reflexes. No cranial nerve deficit or sensory deficit. She exhibits normal muscle tone. Coordination normal.   Skin: Skin is warm and dry. No rash noted. She is not diaphoretic. No erythema. No pallor.   Psychiatric: She has a normal mood and affect. Her behavior is normal. Judgment and thought content normal.   Nursing note and vitals reviewed.      Critical Care  Performed by: Saeed Prado PA-C  Authorized by: Chelita Villafuerte DO     Critical care provider statement:     Critical care time (minutes):  35    Critical care time was exclusive of:  Separately billable procedures and treating other patients    Critical care was necessary to treat or prevent imminent or life-threatening deterioration of the following conditions:  CNS failure or compromise    Critical care was time spent personally by me on the following activities:  Evaluation of patient's response to treatment, discussions with consultants, ordering and review of laboratory studies, ordering and performing treatments and interventions and re-evaluation of patient's condition           Recent Results (from the past 24 hour(s))   Urinalysis With Microscopic If Indicated (No Culture) - Urine, Clean Catch    Collection Time: 08/07/19  6:18 PM   Result Value Ref Range    Color, UA Yellow Yellow, Straw    Appearance, UA Clear Clear    pH, UA 6.0 5.0 - 8.0    Specific Gravity, UA 1.015 1.001 - 1.030    Glucose, UA Negative Negative    Ketones, UA Negative Negative    Bilirubin, UA Negative Negative    Blood, UA Negative Negative    Protein, UA Negative Negative    Leuk Esterase, UA Negative Negative    Nitrite, UA Negative Negative    Urobilinogen, UA 0.2 E.U./dL 0.2 - 1.0 E.U./dL   Comprehensive Metabolic Panel    Collection  Time: 08/07/19  6:33 PM   Result Value Ref Range    Glucose 129 (H) 65 - 99 mg/dL    BUN 22 8 - 23 mg/dL    Creatinine 0.85 0.57 - 1.00 mg/dL    Sodium 139 136 - 145 mmol/L    Potassium 4.4 3.5 - 5.2 mmol/L    Chloride 104 98 - 107 mmol/L    CO2 22.0 22.0 - 29.0 mmol/L    Calcium 9.9 8.6 - 10.5 mg/dL    Total Protein 8.3 6.0 - 8.5 g/dL    Albumin 4.60 3.50 - 5.20 g/dL    ALT (SGPT) 9 1 - 33 U/L    AST (SGOT) 17 1 - 32 U/L    Alkaline Phosphatase 69 39 - 117 U/L    Total Bilirubin 0.3 0.2 - 1.2 mg/dL    eGFR Non African Amer 66 >60 mL/min/1.73    Globulin 3.7 gm/dL    A/G Ratio 1.2 g/dL    BUN/Creatinine Ratio 25.9 (H) 7.0 - 25.0    Anion Gap 13.0 5.0 - 15.0 mmol/L   Lipase    Collection Time: 08/07/19  6:33 PM   Result Value Ref Range    Lipase 49 13 - 60 U/L   Light Blue Top    Collection Time: 08/07/19  6:33 PM   Result Value Ref Range    Extra Tube hold for add-on    Green Top (Gel)    Collection Time: 08/07/19  6:33 PM   Result Value Ref Range    Extra Tube Hold for add-ons.    Lavender Top    Collection Time: 08/07/19  6:33 PM   Result Value Ref Range    Extra Tube hold for add-on    Gold Top - SST    Collection Time: 08/07/19  6:33 PM   Result Value Ref Range    Extra Tube Hold for add-ons.    CBC Auto Differential    Collection Time: 08/07/19  6:33 PM   Result Value Ref Range    WBC 15.73 (H) 3.40 - 10.80 10*3/mm3    RBC 4.26 3.77 - 5.28 10*6/mm3    Hemoglobin 13.4 12.0 - 15.9 g/dL    Hematocrit 41.3 34.0 - 46.6 %    MCV 96.9 79.0 - 97.0 fL    MCH 31.5 26.6 - 33.0 pg    MCHC 32.4 31.5 - 35.7 g/dL    RDW 13.2 12.3 - 15.4 %    RDW-SD 47.0 37.0 - 54.0 fl    MPV 9.4 6.0 - 12.0 fL    Platelets 338 140 - 450 10*3/mm3    Neutrophil % 82.4 (H) 42.7 - 76.0 %    Lymphocyte % 11.6 (L) 19.6 - 45.3 %    Monocyte % 4.3 (L) 5.0 - 12.0 %    Eosinophil % 0.7 0.3 - 6.2 %    Basophil % 0.5 0.0 - 1.5 %    Immature Grans % 0.5 0.0 - 0.5 %    Neutrophils, Absolute 12.96 (H) 1.70 - 7.00 10*3/mm3    Lymphocytes, Absolute 1.82 0.70 -  3.10 10*3/mm3    Monocytes, Absolute 0.68 0.10 - 0.90 10*3/mm3    Eosinophils, Absolute 0.11 0.00 - 0.40 10*3/mm3    Basophils, Absolute 0.08 0.00 - 0.20 10*3/mm3    Immature Grans, Absolute 0.08 (H) 0.00 - 0.05 10*3/mm3    nRBC 0.0 0.0 - 0.2 /100 WBC   Lactic Acid, Plasma    Collection Time: 08/07/19  6:33 PM   Result Value Ref Range    Lactate 1.4 0.5 - 2.0 mmol/L   Protime-INR    Collection Time: 08/07/19  6:33 PM   Result Value Ref Range    Protime 12.5 11.2 - 14.3 Seconds    INR 0.98 0.85 - 1.16   P2Y12 Platelet Inhibition    Collection Time: 08/07/19  9:22 PM   Result Value Ref Range    P2Y12 Reactivity Unit 202 PRU     Note: In addition to lab results from this visit, the labs listed above may include labs taken at another facility or during a different encounter within the last 24 hours. Please correlate lab times with ED admission and discharge times for further clarification of the services performed during this visit.    CT Angiogram Head With & Without Contrast         CT Angiogram Neck With & Without Contrast         MRI Brain Without Contrast         XR Chest PA & Lateral   Final Result   Mild cardiomegaly with tortuous descending thoracic aorta. The lungs are clear      Signer Name: Silvia Mayorga MD    Signed: 8/7/2019 8:27 PM    Workstation Name: [email or web address]     [facility]        Vitals:    08/07/19 2100 08/07/19 2120 08/07/19 2140 08/07/19 2141   BP: 161/71 151/70 154/74    Pulse: 60 60  62   Resp:       Temp:       SpO2: 96% 100% 100% 98%   Weight:       Height:         Medications   sodium chloride 0.9 % flush 10 mL (not administered)   sodium chloride 0.9 % flush 10 mL (not administered)   ondansetron (ZOFRAN) injection 4 mg (4 mg Intravenous Given 8/7/19 1911)   iopamidol (ISOVUE-370) 76 % injection 100 mL (75 mL Intravenous Given 8/7/19 1920)     ECG/EMG Results (last 24 hours)     Procedure Component Value Units Date/Time    ECG 12 Lead [[id number]] Collected:   08/07/19 1842     Updated:  08/07/19 1850        ECG 12 Lead                 ED Course  ED Course as of Aug 07 2221   Wed Aug 07, 2019   2029 REc. Verbal from radiology - Recent acute R cerebellar hemisphere CVA; R vertebral artery completely out, possibly some flow collaterally / distally  [TG]   2038 Discussed with Dr. Chamberlain, on-call stroke intervention, who reviewed patient's CTA.  He states she has severe atherosclerotic disease, and should be managed medically.  Recommended P2 Y 12 platelet inhibition assay to determine Plavix compliance admit to hospitalist with neurology consult and manage medically.  [TG]   2057 D/W Dr. Villafuerte - admit accepted  [TG]   2121 D/W Dr. Castillo, req include ASA, reviewed CTA / MRI report.  States sounds like low risk for decompensation.  [TG]      ED Course User Index  [TG] Saeed Prado PA-C                  MDM      Final diagnoses:   Cerebellar stroke (CMS/HCC)   Atherosclerotic occlusive disease   Essential hypertension            Saeed Prado PA-C  08/07/19 2059       Saeed Prado PA-C  08/07/19 2122       Saeed Prado PA-C  08/07/19 2123       Saeed Prado PA-C  08/07/19 2221

## 2019-08-08 ENCOUNTER — APPOINTMENT (OUTPATIENT)
Dept: CARDIOLOGY | Facility: HOSPITAL | Age: 69
End: 2019-08-08

## 2019-08-08 PROBLEM — R41.3 MEMORY LOSS: Status: ACTIVE | Noted: 2019-08-08

## 2019-08-08 LAB
ALBUMIN SERPL-MCNC: 4 G/DL (ref 3.5–5.2)
ALBUMIN/GLOB SERPL: 1.2 G/DL
ALP SERPL-CCNC: 65 U/L (ref 39–117)
ALT SERPL W P-5'-P-CCNC: 8 U/L (ref 1–33)
ANION GAP SERPL CALCULATED.3IONS-SCNC: 13 MMOL/L (ref 5–15)
AST SERPL-CCNC: 14 U/L (ref 1–32)
BH CV ECHO MEAS - AO MAX PG (FULL): 2.1 MMHG
BH CV ECHO MEAS - AO MAX PG: 7.5 MMHG
BH CV ECHO MEAS - AO ROOT AREA (BSA CORRECTED): 2
BH CV ECHO MEAS - AO ROOT AREA: 8.2 CM^2
BH CV ECHO MEAS - AO ROOT DIAM: 3.2 CM
BH CV ECHO MEAS - AO V2 MAX: 136.8 CM/SEC
BH CV ECHO MEAS - AVA(V,A): 2.5 CM^2
BH CV ECHO MEAS - AVA(V,D): 2.5 CM^2
BH CV ECHO MEAS - BSA(HAYCOCK): 1.7 M^2
BH CV ECHO MEAS - BSA: 1.6 M^2
BH CV ECHO MEAS - BZI_BMI: 25.6 KILOGRAMS/M^2
BH CV ECHO MEAS - BZI_METRIC_HEIGHT: 157.5 CM
BH CV ECHO MEAS - BZI_METRIC_WEIGHT: 63.5 KG
BH CV ECHO MEAS - EDV(CUBED): 101 ML
BH CV ECHO MEAS - EDV(MOD-SP2): 36 ML
BH CV ECHO MEAS - EDV(MOD-SP4): 46 ML
BH CV ECHO MEAS - EDV(TEICH): 100.2 ML
BH CV ECHO MEAS - EF(CUBED): 86.8 %
BH CV ECHO MEAS - EF(MOD-BP): 84 %
BH CV ECHO MEAS - EF(MOD-SP2): 75 %
BH CV ECHO MEAS - EF(MOD-SP4): 89.1 %
BH CV ECHO MEAS - EF(TEICH): 80.5 %
BH CV ECHO MEAS - ESV(CUBED): 13.3 ML
BH CV ECHO MEAS - ESV(MOD-SP2): 9 ML
BH CV ECHO MEAS - ESV(MOD-SP4): 5 ML
BH CV ECHO MEAS - ESV(TEICH): 19.5 ML
BH CV ECHO MEAS - FS: 49.1 %
BH CV ECHO MEAS - IVS/LVPW: 0.99
BH CV ECHO MEAS - IVSD: 0.79 CM
BH CV ECHO MEAS - LA DIMENSION: 2.6 CM
BH CV ECHO MEAS - LA/AO: 0.8
BH CV ECHO MEAS - LAD MAJOR: 5.2 CM
BH CV ECHO MEAS - LAT PEAK E' VEL: 6.9 CM/SEC
BH CV ECHO MEAS - LATERAL E/E' RATIO: 11.9
BH CV ECHO MEAS - LV DIASTOLIC VOL/BSA (35-75): 28 ML/M^2
BH CV ECHO MEAS - LV MASS(C)D: 119.7 GRAMS
BH CV ECHO MEAS - LV MASS(C)DI: 72.8 GRAMS/M^2
BH CV ECHO MEAS - LV MAX PG: 5.3 MMHG
BH CV ECHO MEAS - LV SYSTOLIC VOL/BSA (12-30): 3 ML/M^2
BH CV ECHO MEAS - LV V1 MAX: 115.6 CM/SEC
BH CV ECHO MEAS - LVIDD: 4.7 CM
BH CV ECHO MEAS - LVIDS: 2.4 CM
BH CV ECHO MEAS - LVLD AP2: 5.9 CM
BH CV ECHO MEAS - LVLD AP4: 6.6 CM
BH CV ECHO MEAS - LVLS AP2: 5.1 CM
BH CV ECHO MEAS - LVLS AP4: 4.6 CM
BH CV ECHO MEAS - LVOT AREA (M): 2.8 CM^2
BH CV ECHO MEAS - LVOT AREA: 2.9 CM^2
BH CV ECHO MEAS - LVOT DIAM: 1.9 CM
BH CV ECHO MEAS - LVPWD: 0.8 CM
BH CV ECHO MEAS - MED PEAK E' VEL: 6 CM/SEC
BH CV ECHO MEAS - MEDIAL E/E' RATIO: 13.8
BH CV ECHO MEAS - MV A MAX VEL: 91.4 CM/SEC
BH CV ECHO MEAS - MV DEC TIME: 0.21 SEC
BH CV ECHO MEAS - MV E MAX VEL: 84 CM/SEC
BH CV ECHO MEAS - MV E/A: 0.92
BH CV ECHO MEAS - PA ACC SLOPE: 304 CM/SEC^2
BH CV ECHO MEAS - PA ACC TIME: 0.17 SEC
BH CV ECHO MEAS - PA PR(ACCEL): 1.4 MMHG
BH CV ECHO MEAS - PULM DIAS VEL: 35.7 CM/SEC
BH CV ECHO MEAS - PULM S/D: 1.6
BH CV ECHO MEAS - PULM SYS VEL: 56.9 CM/SEC
BH CV ECHO MEAS - SI(CUBED): 53.4 ML/M^2
BH CV ECHO MEAS - SI(MOD-SP2): 16.4 ML/M^2
BH CV ECHO MEAS - SI(MOD-SP4): 25 ML/M^2
BH CV ECHO MEAS - SI(TEICH): 49.1 ML/M^2
BH CV ECHO MEAS - SV(CUBED): 87.7 ML
BH CV ECHO MEAS - SV(MOD-SP2): 27 ML
BH CV ECHO MEAS - SV(MOD-SP4): 41 ML
BH CV ECHO MEAS - SV(TEICH): 80.7 ML
BH CV ECHO MEAS - TAPSE (>1.6): 1.7 CM2
BH CV ECHO MEASUREMENTS AVERAGE E/E' RATIO: 13.02
BH CV XLRA - RV BASE: 4.4 CM
BH CV XLRA - RV LENGTH: 7.2 CM
BH CV XLRA - RV MID: 3.4 CM
BH CV XLRA - TDI S': 14.3 CM/SEC
BILIRUB SERPL-MCNC: 0.4 MG/DL (ref 0.2–1.2)
BUN BLD-MCNC: 18 MG/DL (ref 8–23)
BUN/CREAT SERPL: 25.4 (ref 7–25)
CALCIUM SPEC-SCNC: 10.1 MG/DL (ref 8.6–10.5)
CHLORIDE SERPL-SCNC: 103 MMOL/L (ref 98–107)
CHOLEST SERPL-MCNC: 175 MG/DL (ref 0–200)
CO2 SERPL-SCNC: 24 MMOL/L (ref 22–29)
CREAT BLD-MCNC: 0.71 MG/DL (ref 0.57–1)
DEPRECATED RDW RBC AUTO: 46.8 FL (ref 37–54)
ERYTHROCYTE [DISTWIDTH] IN BLOOD BY AUTOMATED COUNT: 13.2 % (ref 12.3–15.4)
GFR SERPL CREATININE-BSD FRML MDRD: 81 ML/MIN/1.73
GLOBULIN UR ELPH-MCNC: 3.4 GM/DL
GLUCOSE BLD-MCNC: 97 MG/DL (ref 65–99)
GLUCOSE BLDC GLUCOMTR-MCNC: 89 MG/DL (ref 70–130)
HBA1C MFR BLD: 5.1 % (ref 4.8–5.6)
HCT VFR BLD AUTO: 37.5 % (ref 34–46.6)
HDLC SERPL-MCNC: 53 MG/DL (ref 40–60)
HGB BLD-MCNC: 12.3 G/DL (ref 12–15.9)
LDLC SERPL CALC-MCNC: 91 MG/DL (ref 0–100)
LDLC/HDLC SERPL: 1.72 {RATIO}
LEFT ATRIUM VOLUME INDEX: 21.3 ML/M^2
LEFT ATRIUM VOLUME: 35 ML
LV EF 2D ECHO EST: 75 %
MCH RBC QN AUTO: 31.8 PG (ref 26.6–33)
MCHC RBC AUTO-ENTMCNC: 32.8 G/DL (ref 31.5–35.7)
MCV RBC AUTO: 96.9 FL (ref 79–97)
PLATELET # BLD AUTO: 290 10*3/MM3 (ref 140–450)
PMV BLD AUTO: 9.5 FL (ref 6–12)
POTASSIUM BLD-SCNC: 4 MMOL/L (ref 3.5–5.2)
PROT SERPL-MCNC: 7.4 G/DL (ref 6–8.5)
RBC # BLD AUTO: 3.87 10*6/MM3 (ref 3.77–5.28)
RPR SER QL: NORMAL
SODIUM BLD-SCNC: 140 MMOL/L (ref 136–145)
TRIGL SERPL-MCNC: 154 MG/DL (ref 0–150)
TSH SERPL DL<=0.05 MIU/L-ACNC: 0.64 MIU/ML (ref 0.27–4.2)
VIT B12 BLD-MCNC: 443 PG/ML (ref 211–946)
VLDLC SERPL-MCNC: 30.8 MG/DL
WBC NRBC COR # BLD: 10.68 10*3/MM3 (ref 3.4–10.8)

## 2019-08-08 PROCEDURE — 85027 COMPLETE CBC AUTOMATED: CPT | Performed by: NURSE PRACTITIONER

## 2019-08-08 PROCEDURE — 93306 TTE W/DOPPLER COMPLETE: CPT | Performed by: INTERNAL MEDICINE

## 2019-08-08 PROCEDURE — 99233 SBSQ HOSP IP/OBS HIGH 50: CPT | Performed by: INTERNAL MEDICINE

## 2019-08-08 PROCEDURE — 25010000002 HEPARIN (PORCINE) PER 1000 UNITS: Performed by: INTERNAL MEDICINE

## 2019-08-08 PROCEDURE — 99223 1ST HOSP IP/OBS HIGH 75: CPT | Performed by: PSYCHIATRY & NEUROLOGY

## 2019-08-08 PROCEDURE — 97162 PT EVAL MOD COMPLEX 30 MIN: CPT

## 2019-08-08 PROCEDURE — 80053 COMPREHEN METABOLIC PANEL: CPT | Performed by: NURSE PRACTITIONER

## 2019-08-08 PROCEDURE — 84425 ASSAY OF VITAMIN B-1: CPT | Performed by: FAMILY MEDICINE

## 2019-08-08 PROCEDURE — 84443 ASSAY THYROID STIM HORMONE: CPT | Performed by: FAMILY MEDICINE

## 2019-08-08 PROCEDURE — 97165 OT EVAL LOW COMPLEX 30 MIN: CPT

## 2019-08-08 PROCEDURE — 92523 SPEECH SOUND LANG COMPREHEN: CPT

## 2019-08-08 PROCEDURE — 82962 GLUCOSE BLOOD TEST: CPT

## 2019-08-08 PROCEDURE — 93306 TTE W/DOPPLER COMPLETE: CPT

## 2019-08-08 PROCEDURE — 25010000002 ONDANSETRON PER 1 MG: Performed by: FAMILY MEDICINE

## 2019-08-08 PROCEDURE — 83036 HEMOGLOBIN GLYCOSYLATED A1C: CPT | Performed by: NURSE PRACTITIONER

## 2019-08-08 PROCEDURE — 82607 VITAMIN B-12: CPT | Performed by: FAMILY MEDICINE

## 2019-08-08 PROCEDURE — 97110 THERAPEUTIC EXERCISES: CPT

## 2019-08-08 PROCEDURE — 86592 SYPHILIS TEST NON-TREP QUAL: CPT | Performed by: FAMILY MEDICINE

## 2019-08-08 PROCEDURE — 80061 LIPID PANEL: CPT | Performed by: NURSE PRACTITIONER

## 2019-08-08 RX ORDER — CHOLECALCIFEROL (VITAMIN D3) 125 MCG
5 CAPSULE ORAL NIGHTLY PRN
Status: DISCONTINUED | OUTPATIENT
Start: 2019-08-08 | End: 2019-08-12 | Stop reason: HOSPADM

## 2019-08-08 RX ORDER — ONDANSETRON 4 MG/1
8 TABLET, FILM COATED ORAL EVERY 8 HOURS PRN
Status: DISCONTINUED | OUTPATIENT
Start: 2019-08-08 | End: 2019-08-12 | Stop reason: HOSPADM

## 2019-08-08 RX ORDER — HEPARIN SODIUM 5000 [USP'U]/ML
5000 INJECTION, SOLUTION INTRAVENOUS; SUBCUTANEOUS EVERY 8 HOURS SCHEDULED
Status: DISCONTINUED | OUTPATIENT
Start: 2019-08-08 | End: 2019-08-12 | Stop reason: HOSPADM

## 2019-08-08 RX ORDER — ONDANSETRON 2 MG/ML
4 INJECTION INTRAMUSCULAR; INTRAVENOUS EVERY 6 HOURS PRN
Status: DISCONTINUED | OUTPATIENT
Start: 2019-08-08 | End: 2019-08-08 | Stop reason: SDUPTHER

## 2019-08-08 RX ADMIN — FLUOXETINE HYDROCHLORIDE 20 MG: 20 CAPSULE ORAL at 09:58

## 2019-08-08 RX ADMIN — CLOPIDOGREL BISULFATE 75 MG: 75 TABLET ORAL at 09:58

## 2019-08-08 RX ADMIN — AMLODIPINE BESYLATE 5 MG: 5 TABLET ORAL at 09:58

## 2019-08-08 RX ADMIN — ASPIRIN 325 MG ORAL TABLET 325 MG: 325 PILL ORAL at 00:50

## 2019-08-08 RX ADMIN — HEPARIN SODIUM 5000 UNITS: 5000 INJECTION INTRAVENOUS; SUBCUTANEOUS at 22:00

## 2019-08-08 RX ADMIN — MELATONIN TAB 5 MG 5 MG: 5 TAB at 01:41

## 2019-08-08 RX ADMIN — ONDANSETRON HYDROCHLORIDE 8 MG: 4 TABLET, FILM COATED ORAL at 12:35

## 2019-08-08 RX ADMIN — Medication 100 MG: at 09:58

## 2019-08-08 RX ADMIN — HEPARIN SODIUM 5000 UNITS: 5000 INJECTION INTRAVENOUS; SUBCUTANEOUS at 20:41

## 2019-08-08 RX ADMIN — SODIUM CHLORIDE, PRESERVATIVE FREE 3 ML: 5 INJECTION INTRAVENOUS at 00:47

## 2019-08-08 RX ADMIN — FOLIC ACID 1 MG: 1 TABLET ORAL at 09:58

## 2019-08-08 RX ADMIN — SODIUM CHLORIDE, PRESERVATIVE FREE 3 ML: 5 INJECTION INTRAVENOUS at 10:00

## 2019-08-08 RX ADMIN — ATORVASTATIN CALCIUM 80 MG: 40 TABLET, FILM COATED ORAL at 00:46

## 2019-08-08 RX ADMIN — ONDANSETRON 4 MG: 2 INJECTION INTRAMUSCULAR; INTRAVENOUS at 00:50

## 2019-08-08 RX ADMIN — SODIUM CHLORIDE, PRESERVATIVE FREE 3 ML: 5 INJECTION INTRAVENOUS at 20:41

## 2019-08-08 RX ADMIN — ATORVASTATIN CALCIUM 80 MG: 40 TABLET, FILM COATED ORAL at 20:41

## 2019-08-08 RX ADMIN — LOSARTAN POTASSIUM 100 MG: 50 TABLET ORAL at 09:58

## 2019-08-08 NOTE — H&P
"[facility] Medicine Services  HISTORY AND PHYSICAL    Patient Name: Natasha Flores  : 1950  MRN: [id number]  Primary Care Physician: Jeffrey Ca MD    Subjective   Subjective     Chief Complaint:  Dizziness, nausea, vomiting     HPI:  Natasha Flores is a 69 y.o. female with a past medical history significant for essential hypertension, PAD, depression, anxiety, alcohol dependence and tobacco abuse presents to the ED accompanied by family with complaints of dizziness, nausea and vomiting.  Patient reports she had been doing well up until about 3 pm today when she had acute onset of dizziness followed by severe nausea and vomiting that lasted about three hours.  She reports feeling lightheaded as if she was going to pass out.  She denies any of these symptoms prior. In addition she notes a headache to the \"back of my head\" along with tingling/numbess to her fingertips.  She reports about a month ago she had bilateral femoral stents placed along with two stents to there right lower extremity by Dr. Barajas at [facility].  She is to have stents placed to her left lower extremity tomorrow.  Daughters also report she was started on plavix a month ago. She denies any shortness of air, chest pain, abdominal pain, diarrhea, dysuria, hematuria, upper or lower extremity weakness, palpitations, fever, cough, speech changes or vision changes.  MRI of brain tonight shows no hemorrhage.  Recent/acute stroke involving the inferior medial right cerebellar hemisphere extending into the mid right cerebellar hemisphere posteriorly and slightly laterally.  Mild edematous change.  No distinct hydrocephalus or midline shift.  Extensive yogi-ventricular white matter changes likely reflect sequela of chronic ischemic small vessel disease.  No mass lesion.  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  Patient currently denies any symptoms " including feeling nauseous or dizzy.  However she does note with any quick position change she feels nauseous.  She will be admitted to [facility] under the care of the hospitalist for further evaluation and treatment.    -of note family reports that patient has had short term memory issues over the past 6 months to a year and is currently being worked up for possible dementia/alzheimers       Review of Systems   Constitutional: Positive for activity change. Negative for appetite change, chills, diaphoresis, fatigue, fever and unexpected weight change.   Eyes: Negative for photophobia and visual disturbance.   Respiratory: Negative for cough and shortness of breath.    Cardiovascular: Negative for chest pain, palpitations and leg swelling.   Gastrointestinal: Positive for nausea and vomiting. Negative for abdominal distention, abdominal pain, blood in stool, constipation and diarrhea.   Genitourinary: Negative for difficulty urinating, dysuria, flank pain and frequency.   Musculoskeletal: Positive for gait problem. Negative for neck pain and neck stiffness.   Skin: Negative.    Neurological: Positive for dizziness, light-headedness, numbness and headaches. Negative for tremors, syncope, facial asymmetry, speech difficulty and weakness.   Psychiatric/Behavioral: Positive for agitation (family reports chronic).        Memory loss        Otherwise 10-system ROS reviewed and is negative except as mentioned in the HPI.    Personal History     Past Medical History:   Diagnosis Date   • Anxiety    • Anxiety    • Depression    • Hypertension        Past Surgical History:   Procedure Laterality Date   •  SECTION     • LEG SURGERY      STENT PLACEMENT         Family History: family history includes Stroke in her maternal grandmother.     Social History:  reports that she has quit smoking. Her smoking use included cigarettes. She smoked 1.00 pack per day. She has never used smokeless tobacco. She reports  that she does not drink alcohol or use drugs.    Medications:  Medications Prior to Admission   Medication Sig Dispense Refill Last Dose   • amLODIPine (NORVASC) 5 MG tablet Take 5 mg by mouth Daily.      • clopidogrel (PLAVIX) 75 MG tablet Take 75 mg by mouth Daily.      • FLUoxetine (PROzac) 10 MG capsule Take 20 mg by mouth Daily.      • folic acid (FOLVITE) 1 MG tablet Take 1 mg by mouth Daily.      • losartan (COZAAR) 50 MG tablet Take 100 mg by mouth Daily.      • Thiamine HCl (THIAMINE PO) Take  by mouth. PT unsure of dose      • busPIRone (BUSPAR) 10 MG tablet Take 10 mg by mouth 2 (Two) Times a Day.      • hydrochlorothiazide (HYDRODIURIL) 12.5 MG tablet Take 12.5 mg by mouth Daily.      • lisinopril (PRINIVIL,ZESTRIL) 20 MG tablet Take 20 mg by mouth Daily.      • metoprolol succinate XL (TOPROL-XL) 25 MG 24 hr tablet Take 25 mg by mouth Daily.      • ondansetron ODT (ZOFRAN-ODT) 8 MG disintegrating tablet Take 1 tablet by mouth Every 8 (Eight) Hours As Needed for Nausea or Vomiting. 10 tablet 0    • risperiDONE (risperDAL M-TABS) 1 MG disintegrating tablet Take 1 mg by mouth Daily.      • sertraline (ZOLOFT) 50 MG tablet Take 50 mg by mouth Daily.      • traZODone (DESYREL) 50 MG tablet Take 50 mg by mouth Every Night.          Allergies   Allergen Reactions   • Penicillins        Objective   Objective     Vital Signs:   Temp:  [97.5 °F (36.4 °C)] 97.5 °F (36.4 °C)  Heart Rate:  [59-64] 60  Resp:  [18] 18  BP: (145-174)/(64-75) 151/70   Total (NIH Stroke Scale): 0    Physical Exam   Constitutional: She is oriented to person, place, and time. She appears well-developed and well-nourished. No distress.   Alert and oriented x4, daughters present    HENT:   Head: Normocephalic and atraumatic.   Eyes: Conjunctivae are normal. Pupils are equal, round, and reactive to light. Right eye exhibits no discharge. Left eye exhibits no discharge. No scleral icterus.   Nystagmus noted to right eye with lateral gaze     Neck: Normal range of motion. Neck supple. No JVD present. No tracheal deviation present. No thyromegaly present.   Cardiovascular: Normal rate, regular rhythm, normal heart sounds and intact distal pulses. Exam reveals no gallop and no friction rub.   No murmur heard.  Pulmonary/Chest: Effort normal and breath sounds normal. No stridor. No respiratory distress. She has no wheezes. She has no rales. She exhibits no tenderness.   Abdominal: Soft. Bowel sounds are normal. She exhibits no distension and no mass. There is no tenderness. There is no rebound and no guarding. No hernia.   Musculoskeletal: Normal range of motion. She exhibits no edema, tenderness or deformity.   Lymphadenopathy:     She has no cervical adenopathy.   Neurological: She is alert and oriented to person, place, and time.    equal, no facial droop, no lower extremity deficits, speech clear, no pronator drift. Nystagmus noted to right eye with lateral gaze.    Skin: Skin is warm and dry. No rash noted. She is not diaphoretic. No erythema. No pallor.   Psychiatric: Her behavior is normal. Judgment and thought content normal.   Reports she is agitated because she is thirsty    Nursing note and vitals reviewed.       Results Reviewed:  I have personally reviewed current lab, radiology, and data and agree.    Results from last 7 days   Lab Units 08/07/19  1833   WBC 10*3/mm3 15.73*   HEMOGLOBIN g/dL 13.4   HEMATOCRIT % 41.3   PLATELETS 10*3/mm3 338   INR  0.98     Results from last 7 days   Lab Units 08/07/19  1833   SODIUM mmol/L 139   POTASSIUM mmol/L 4.4   CHLORIDE mmol/L 104   CO2 mmol/L 22.0   BUN mg/dL 22   CREATININE mg/dL 0.85   GLUCOSE mg/dL 129*   CALCIUM mg/dL 9.9   ALT (SGPT) U/L 9   AST (SGOT) U/L 17     No results found for: BNP  No results found for: PHART, PCO2  Imaging Results (last 24 hours)     Procedure Component Value Units Date/Time    CT Angiogram Head With & Without Contrast [[id number]] Collected:  08/07/19 2035      Updated:  08/07/19 2121    Narrative:       CTA Neck, CTA Head, MRI Brain WO     INDICATION:    Sudden onset of dizziness    Head and neck CTA:    Neck -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 There is some minimal intermittent flow within the right vertebral artery distally which may relate to collateral flow. Left vertebral artery patent with at least  moderate atherosclerotic change. The basilar artery is patent. Posterior cerebral arteries not well visualized or assessed but appear to be faintly patent with a threadlike appearance particularly on the right.    There is heavy atherosclerotic change of the common carotid arteries bilaterally, particularly at the carotid bulbs bilaterally. Common carotid arteries and internal carotid arteries do remain patent. There is heavy atherosclerotic change of the petrous  cavernous and supraclinoid internal carotid arteries bilaterally. Middle cerebral arteries patent bilaterally with a somewhat threadlike appearance along with the anterior cerebral arteries.    Brain MRI:  No hemorrhage. Recent/acute stroke involving the inferior medial right cerebellar hemisphere extending into the mid right cerebellar hemisphere posteriorly and slightly laterally. Mild edematous change. No distinct hydrocephalus or midline shift.  Extensive periventricular white matter changes likely reflect sequela of chronic ischemic small vessel disease. No convincing evidence of hemorrhagic transformation at this time. No mass lesion.     This is a preliminary wet read performed by Juvenal Marquez M.D. Final interpretation will be provided by neuroradiology. Please refer to final interpretation for detailed findings and any further recommendations.    NOTIFICATION: Critical Value/emergent results were called by telephone at the time of interpretation on 8/7/2019 8:32 PM to BLAKE Thomas who verbally acknowledged these results.        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 There is some minimal intermittent flow within the right vertebral artery distally which may relate to collateral flow. Left vertebral artery patent with at least  moderate atherosclerotic change. The basilar artery is patent. Posterior cerebral arteries not well visualized or assessed but appear to be faintly patent with a threadlike appearance particularly on the right.    There is heavy atherosclerotic change of the common carotid arteries bilaterally, particularly at the carotid bulbs bilaterally. Common carotid arteries and internal carotid arteries do remain patent. There is heavy atherosclerotic change of the petrous  cavernous and supraclinoid internal carotid arteries bilaterally. Middle cerebral arteries patent bilaterally with a somewhat threadlike appearance along with the anterior cerebral arteries.    Brain MRI:  No hemorrhage. Recent/acute stroke involving the inferior medial right cerebellar hemisphere extending into the mid right cerebellar hemisphere posteriorly and slightly laterally. Mild edematous change. No distinct hydrocephalus or midline shift.  Extensive periventricular white matter changes likely reflect sequela of chronic ischemic small vessel disease. No convincing evidence of hemorrhagic transformation at this time. No mass lesion.     This is a preliminary wet read performed by Juvenal Marquez M.D. Final interpretation will be provided by neuroradiology. Please refer to final interpretation for detailed findings and any further recommendations.    NOTIFICATION: Critical Value/emergent results were called by telephone at the time of interpretation on 8/7/2019 8:32 PM to BLAKE Thomas who verbally  acknowledged these results.        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 There is some minimal intermittent flow within the right vertebral artery distally which may relate to collateral flow. Left vertebral artery patent with at least  moderate atherosclerotic change. The basilar artery is patent. Posterior cerebral arteries not well visualized or assessed but appear to be faintly patent with a threadlike appearance particularly on the right.    There is heavy atherosclerotic change of the common carotid arteries bilaterally, particularly at the carotid bulbs bilaterally. Common carotid arteries and internal carotid arteries do remain patent. There is heavy atherosclerotic change of the petrous  cavernous and supraclinoid internal carotid arteries bilaterally. Middle cerebral arteries patent bilaterally with a somewhat threadlike appearance along with the anterior cerebral arteries.    Brain MRI:  No hemorrhage. Recent/acute stroke involving the inferior medial right cerebellar hemisphere extending into the mid right cerebellar hemisphere posteriorly and slightly laterally. Mild edematous change. No distinct hydrocephalus or midline shift.  Extensive periventricular white matter changes likely reflect sequela of chronic ischemic small vessel disease. No convincing evidence of hemorrhagic transformation at this time. No mass lesion.     This is a preliminary wet read performed by Juvenal Marquez M.D. Final interpretation will be provided by neuroradiology. Please refer to final interpretation for detailed findings and any further recommendations.    NOTIFICATION: Critical Value/emergent results were called by telephone at the time of interpretation on  8/7/2019 8:32 PM to BLAKE Thomas who verbally acknowledged these results.        XR Chest PA & Lateral [[id number]] Collected:  08/07/19 2027     Updated:  08/07/19 2030    Narrative:       CR Chest 2 Vws    INDICATION:    Acute onset of dizziness while sweeping her porch today with associated vomiting.    COMPARISON:    7/6/2017    FINDINGS:   PA and lateral views of the chest.  There is mild cardiomegaly and tortuous descending thoracic aorta. The pulmonary vascularity is normal. The lungs are clear of acute densities. Stable calcified granuloma at the left base        Impression:       Mild cardiomegaly with tortuous descending thoracic aorta. The lungs are clear    Signer Name: Silvia Mayorga MD   Signed: 8/7/2019 8:27 PM   Workstation Name: [email or web address]    [facility]             Assessment/Plan   Assessment / Plan     Active Hospital Problems    Diagnosis   • **Cerebellar stroke (CMS/HCC)   • Generalized anxiety disorder   • Depression   • PAD (peripheral artery disease) (CMS/HCC)   • Essential hypertension   • History of alcohol dependence (CMS/HCC)   • History of tobacco abuse   • Leukocytosis         Assessment & Plan:  69 year old female presents to the ED with complaints of dizzines, nausea and vomiting that started suddenly today at 3 pm.    1) Cerebellar stroke  -CT, CTA head and neck, MRI as mentioned above  -neurology to see in am  -start high intensity statin  -ASA now and daily   -continue plavix, P2Y12: 202  -bubble echo in am  -check FLP, Hgb A1c  -NIH: 0  -passed bedside dysphagia screen  -consult PT/OT/SLP  In am    2) Leukocytosis  -etiology unclear  -afebrile  -UA negative, CXR negative  -will continue to monitor  -lactic acid 1.4  -repeat labs in am    3) PAD  -with recent stents placed at [facility]   -will continue plavix   -neurovascular checks    4) Essential hypertension  -on norvasc, cozaar , lisinopril  -allow for permissive hypertension due to  "#1    5) Anxiety/depression  -continue prozac     6) History of alcohol abuse  -reports \"heavy drinker\" up until about four months ago    7) Tobacco abuse  -reports she quit smoking about 5 weeks ago.     DVT prophylaxis: severe PAD and CVA: no prophylaxis    CODE STATUS:  Full code   Code Status and Medical Interventions:   Ordered at: 08/07/19 3787     Level Of Support Discussed With:    Patient     Code Status:    CPR     Medical Interventions (Level of Support Prior to Arrest):    Full       Admission Status:  I believe this patient meets INPATIENT status due to the need for care which can only be reasonably provided in an hospital setting of cerebellar CVA.  As such, I feel patient’s risk for adverse outcomes and need for care warrant INPATIENT evaluation and predict the patient’s care encounter to likely last beyond 2 midnights.    SANDRA Lawrence   08/07/19   9:46 PM      Brief Attending Admission Attestation     I have seen and examined the patient, performing an independent face-to-face diagnostic evaluation with plan of care reviewed and developed with the advanced practice clinician (APC).      Brief Summary Statement:   Natasha Flores is a 69 y.o. female medical history of hypertension, history of tobacco abuse, history of EtOH abuse currently in remission, peripheral vascular disease status post recent stent placement in the right lower extremity.  Patient presented to [facility] ED with complaint of sudden onset of dizziness and fatigue.  States that she was sweeping outside her home around 2:30 PM when her symptoms started.  When walking back to her house she states she was staggering and falling against the walls.  Then she developed nausea and vomiting.  Her family brought her to the emergency department and she was noted on MRI to have recent acute stroke involving the inferior medial right cerebellar hemisphere extending into the mid right cerebral hemisphere posteriorly.  Patient will be admitted to " telemetry.  We will initiate stroke work-up and consult neurology in the a.m.    Remainder of detailed HPI is as noted above and has been reviewed and/or edited by me for completeness.      Attending Physical Exam:  Constitutional: No acute distress, awake, alert  HENT: NCAT, mucous membranes moist  Respiratory: Clear to auscultation bilaterally, respiratory effort normal   Cardiovascular: RRR, no murmurs, rubs, or gallops, palpable pedal pulses bilaterally, decreased pedal pulses in the left foot.   Gastrointestinal: Positive bowel sounds, soft, nontender, nondistended  Musculoskeletal: No bilateral ankle edema  Psychiatric: Appropriate affect, cooperative  Neurologic: A&O, sensation intact, MS equal in upper and lower ext.   CN intact. She was however not orientated to the year.           Brief Assessment/Plan :  See above for further detailed assessment and plan developed with APC which I have reviewed and/or edited for completeness.      Electronically signed by Nancy Sorenson DO, 08/08/19, 12:44 AM.

## 2019-08-08 NOTE — PLAN OF CARE
Problem: Patient Care Overview  Goal: Plan of Care Review  Outcome: Ongoing (interventions implemented as appropriate)   08/08/19 0958 08/08/19 1031 08/08/19 1751   Coping/Psychosocial   Plan of Care Reviewed With --  patient --    Plan of Care Review   Progress improving --  --    OTHER   Outcome Summary --  --  pt has shown no s/sx of deficit today. has ambulated well and has no complaints. VSS     Goal: Individualization and Mutuality  Outcome: Ongoing (interventions implemented as appropriate)    Goal: Discharge Needs Assessment  Outcome: Ongoing (interventions implemented as appropriate)   08/08/19 0828   Discharge Needs Assessment   Readmission Within the Last 30 Days no previous admission in last 30 days   Concerns to be Addressed denies needs/concerns at this time   Patient/Family Anticipates Transition to home with help/services   Patient/Family Anticipated Services at Transition none   Transportation Concerns car, none   Transportation Anticipated family or friend will provide   Anticipated Changes Related to Illness none   Equipment Needed After Discharge none   Discharge Facility/Level of Care Needs home with home health   Offered/Gave Vendor List no   Disability   Equipment Currently Used at Home none     Goal: Interprofessional Rounds/Family Conf  Outcome: Ongoing (interventions implemented as appropriate)      Problem: Fall Risk (Adult)  Intervention: Monitor/Assist with Self Care   08/07/19 2200 08/08/19 1600   Activity   Activity Assistance Provided --  assistance, stand-by   Daily Care Interventions   Self-Care Promotion independence encouraged;BADL personal objects within reach --      Intervention: Reduce Risk/Promote Restraint Free Environment   08/08/19 1600   Safety Management   Environmental Safety Modification assistive device/personal items within reach;clutter free environment maintained;room organization consistent   Safety Promotion/Fall Prevention activity supervised;fall prevention  program maintained;safety round/check completed;nonskid shoes/slippers when out of bed   Prevent [place] Drop/Fall   Safety/Security Measures bed alarm set     Intervention: Review Medications/Identify Contributors to Fall Risk   19 1600   Safety Management   Medication Review/Management medications reviewed;high risk medications identified       Goal: Identify Related Risk Factors and Signs and Symptoms  Outcome: Ongoing (interventions implemented as appropriate)    Goal: Absence of Fall  Outcome: Ongoing (interventions implemented as appropriate)   19 1751   Fall Risk (Adult)   Absence of Fall making progress toward outcome

## 2019-08-08 NOTE — PLAN OF CARE
Problem: Patient Care Overview  Goal: Plan of Care Review  Outcome: Ongoing (interventions implemented as appropriate)   08/08/19 1031   Coping/Psychosocial   Plan of Care Reviewed With patient   OTHER   Outcome Summary Pt with current deficits that have decreased ADL I. Pt with dizziness/nausea with any positional changes, resulting in decreased I with bending for ADLs. May need IRF if symptoms persist.

## 2019-08-08 NOTE — CONSULTS
"Subjective     CC: stroke    History of Present Illness   Natasha Flores is a 69 y.o. female is seen today in consultation for stroke. She had the acute onset of vertigo, nausea and emesis yesterday afternoon. Her symptoms have persisted largely unchanged. She denies other associated neurologic signs or symptoms. Her symptoms are worsened with standing.     I have reviewed and confirmed the past family, social and medical history as accurate on 8/8/19.     Review of Systems   Constitutional: Negative.    Respiratory: Negative.    Cardiovascular: Negative.    Gastrointestinal: Positive for nausea.   Genitourinary: Negative.    All other systems reviewed and are negative.      Objective   General appearance today is normal.   Peripheral pulses were present and symmetric.   The ophthalmoscopic exam today is unremarkable. This discs and posterior elements are unremarkable.    /77 (BP Location: Left arm, Patient Position: Lying)   Pulse 66   Temp 97.2 °F (36.2 °C) (Oral)   Resp 20   Ht 157.5 cm (62\")   Wt 63.5 kg (140 lb)   SpO2 100%   BMI 25.61 kg/m²     Physical Exam   Constitutional: She is oriented to person, place, and time.   Neurological: She is oriented to person, place, and time. She has normal strength. She has a normal Finger-Nose-Finger Test.   Reflex Scores:       Tricep reflexes are 1+ on the right side and 1+ on the left side.       Bicep reflexes are 1+ on the right side and 1+ on the left side.       Brachioradialis reflexes are 1+ on the right side and 1+ on the left side.       Patellar reflexes are 1+ on the right side and 1+ on the left side.       Achilles reflexes are 1+ on the right side and 1+ on the left side.  Psychiatric: Her speech is normal.        Neurologic Exam     Mental Status   Oriented to person, place, and time.   Registration: recalls 3 of 3 objects. Recall at 5 minutes: recalls 3 of 3 objects. Follows 3 step commands.   Attention: normal. Concentration: normal.   Speech: " speech is normal   Level of consciousness: alert  Knowledge: good.   Normal comprehension.     Cranial Nerves   Cranial nerves II through XII intact.     Motor Exam   Muscle bulk: normal  Overall muscle tone: normal    Strength   Strength 5/5 throughout.     Sensory Exam   Light touch normal.     Gait, Coordination, and Reflexes     Gait  Gait: (ataxic)    Coordination   Finger to nose coordination: normal    Reflexes   Right brachioradialis: 1+  Left brachioradialis: 1+  Right biceps: 1+  Left biceps: 1+  Right triceps: 1+  Left triceps: 1+  Right patellar: 1+  Left patellar: 1+  Right achilles: 1+  Left achilles: 1+      Laboratory and radiological testing: MRI-right cerebellar infarct (I reviewed images personally); CMP/coag's-OK      Assessment/Plan     Natasha Flores is seen for cerebellar stroke. CTA reportedly shows right vertebral occlusion, but was not deemed a candidate for IA intervention by the neuro-interventional team. I agree, then, with medical management (ASA/Plavix/statin) while awaiting her ECHO. This was discussed with Ms Flores.    As part of this visit I reviewed prior lab results, reviewed radiology results, reviewed radiology images and reviewed records from the current hospitalization which is incorporated in the HPI. Please see above for details.

## 2019-08-08 NOTE — THERAPY EVALUATION
Acute Care - Physical Therapy Initial Evaluation  [facility]     Patient Name: Natasha Flores  : 1950  MRN: [id number]  Today's Date: 2019                Admit Date: 2019    Visit Dx:     ICD-10-CM ICD-9-CM   1. Cerebellar stroke (CMS/HCC) I63.9 434.91   2. Atherosclerotic occlusive disease I70.90 440.9   3. Essential hypertension I10 401.9     Patient Active Problem List   Diagnosis   • Cerebellar stroke (CMS/HCC)   • Generalized anxiety disorder   • Depression   • PAD (peripheral artery disease) (CMS/HCC)   • Essential hypertension   • History of alcohol dependence (CMS/HCC)   • History of tobacco abuse   • Leukocytosis   • Memory loss     Past Medical History:   Diagnosis Date   • Anxiety    • Anxiety    • Depression    • Hypertension    • PVD (peripheral vascular disease) (CMS/[facility])      Past Surgical History:   Procedure Laterality Date   •  SECTION     • LEG SURGERY      STENT PLACEMENT          PT ASSESSMENT (last 12 hours)      Physical Therapy Evaluation    No documentation.       Physical Therapy Education     Title: PT OT SLP Therapies (In Progress)     Topic: Physical Therapy (In Progress)     Point: Mobility training (In Progress)     Learning Progress Summary           Patient Acceptance, E,D, NR by ROSA at 2019  9:15 AM                   Point: Home exercise program (In Progress)     Learning Progress Summary           Patient Acceptance, E,D, NR by ROSA at 2019  9:15 AM                   Point: Body mechanics (In Progress)     Learning Progress Summary           Patient Acceptance, E,D, NR by  at 2019  9:15 AM                   Point: Precautions (In Progress)     Learning Progress Summary           Patient Acceptance, E,D, NR by  at 2019  9:15 AM                               User Key     Initials Effective Dates Name Provider Type Discipline    ROSA 18 -  Lashell Brunson PT Physical Therapist PT              PT Recommendation and Plan  Anticipated  Discharge Disposition (PT): home with home health, home with assist  Planned Therapy Interventions (PT Eval): balance training, gait training, patient/family education  Therapy Frequency (PT Clinical Impression): daily  Outcome Summary/Treatment Plan (PT)  Anticipated Equipment Needs at Discharge (PT): rolling knee walker  Anticipated Discharge Disposition (PT): home with home health, home with assist  Plan of Care Reviewed With: patient  Progress: no change  Outcome Summary: PT evaluation completed, Pt has dizziness when upright, fall precautions needed. Progress pt's gait training distance. Use AD.      Time Calculation:   PT Charges     Row Name 08/08/19 0919             Time Calculation    Start Time  0825  -BD      PT Received On  08/08/19  -BD      PT Goal Re-Cert Due Date  08/18/19  -         Time Calculation- PT    Total Timed Code Minutes- PT  25 minute(s)  -        User Key  (r) = Recorded By, (t) = Taken By, (c) = Cosigned By    Initials Name Provider Type    BD Lashell Brunson, PT Physical Therapist        Therapy Charges for Today     Code Description Service Date Service Provider Modifiers Qty    01471431705 HC PT THER PROC EA 15 MIN 8/8/2019 Lashell Brunson, PT GP 2    41013919865 HC PT THER SUPP EA 15 MIN 8/8/2019 Lashell Brunson, PT GP 2    07856026117 HC PT EVAL MOD COMPLEXITY 2 8/8/2019 Lashell Brunson, PT GP 1          PT G-Codes  Outcome Measure Options: AM-PAC 6 Clicks Basic Mobility (PT)  AM-PAC 6 Clicks Score (PT): 16      Lashell Brunson, TABITHA  8/8/2019

## 2019-08-08 NOTE — PROGRESS NOTES
[facility] Medicine Services  PROGRESS NOTE    Patient Name: Natasha Flores  : 1950  MRN: [id number]    Date of Admission: 2019  Length of Stay: 1  Primary Care Physician: Jeffrey Ca MD    Subjective   Subjective     CC:  F/u stroke    HPI:  Dtr present.  Patient still complains of some dizziness, ataxia, and nausea with movement.    Review of Systems  Gen- No fevers, chills  CV- No chest pain, palpitations  Resp- No cough, dyspnea  GI- No N/V/D, abd pain    Otherwise ROS is negative except as mentioned in the HPI.    Objective   Objective     Vital Signs:   Temp:  [97.2 °F (36.2 °C)-97.7 °F (36.5 °C)] 97.7 °F (36.5 °C)  Heart Rate:  [55-82] 59  Resp:  [17-20] 17  BP: (113-174)/(61-83) 113/61  Total (NIH Stroke Scale): 0     Physical Exam:  Constitutional: No acute distress, awake, alert, sitting up in chair  HENT: NCAT, mucous membranes moist  Respiratory: Clear to auscultation bilaterally, respiratory effort normal   Cardiovascular: marilyn, regular, no murmurs, rubs, or gallops   Gastrointestinal: Positive bowel sounds, soft, nontender, nondistended  Musculoskeletal: No bilateral ankle edema  Psychiatric: Appropriate affect, cooperative  Neurologic: Oriented x 3, some impaired cooridination, speech normal, gait not observed  Skin: No rashes    Results Reviewed:  I have personally reviewed current lab, radiology, and data and agree.    Results from last 7 days   Lab Units 19  0753 19  1833   WBC 10*3/mm3 10.68 15.73*   HEMOGLOBIN g/dL 12.3 13.4   HEMATOCRIT % 37.5 41.3   PLATELETS 10*3/mm3 290 338   INR   --  0.98     Results from last 7 days   Lab Units 19  0753 19  1833   SODIUM mmol/L 140 139   POTASSIUM mmol/L 4.0 4.4   CHLORIDE mmol/L 103 104   CO2 mmol/L 24.0 22.0   BUN mg/dL 18 22   CREATININE mg/dL 0.71 0.85   GLUCOSE mg/dL 97 129*   CALCIUM mg/dL 10.1 9.9   ALT (SGPT) U/L 8 9   AST (SGOT) U/L 14 17     Estimated Creatinine Clearance: 56.5 mL/min  (by C-G formula based on SCr of 0.71 mg/dL).    Microbiology Results Abnormal     None          Imaging Results (last 24 hours)     Procedure Component Value Units Date/Time    CT Angiogram Head With & Without Contrast [[id number]] Collected:  08/07/19 2035     Updated:  08/08/19 0839    Narrative:       CTA Neck, CTA Head, MRI Brain WO     INDICATION:    Sudden onset of dizziness    Head and neck CTA:    Neck -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 There is some minimal intermittent flow within the right vertebral artery distally which may relate to collateral flow. Left vertebral artery patent with at least  moderate atherosclerotic change. The basilar artery is patent. Posterior cerebral arteries not well visualized or assessed but appear to be faintly patent with a threadlike appearance particularly on the right.    There is heavy atherosclerotic change of the common carotid arteries bilaterally, particularly at the carotid bulbs bilaterally. Common carotid arteries and internal carotid arteries do remain patent. There is heavy atherosclerotic change of the petrous  cavernous and supraclinoid internal carotid arteries bilaterally. Middle cerebral arteries patent bilaterally with a somewhat threadlike appearance along with the anterior cerebral arteries.    Brain MRI:  No hemorrhage. Recent/acute stroke involving the inferior medial right cerebellar hemisphere extending into the mid right cerebellar hemisphere posteriorly and slightly laterally. Mild edematous change. No distinct hydrocephalus or midline shift.  Extensive periventricular white matter changes likely reflect sequela of chronic ischemic small vessel disease. No convincing evidence of hemorrhagic transformation at this time. No mass lesion.     This is a preliminary wet read performed by Juvenal Marquez M.D. Final interpretation will be provided by neuroradiology. Please refer to final interpretation for  detailed findings and any further recommendations.    NOTIFICATION: Critical Value/emergent results were called by telephone at the time of interpretation on 8/7/2019 8:32 PM to BLAKE Thomas who verbally acknowledged these results.    Final interpretation by neuroradiology.    CTA of the head and neck:    TECHNIQUE:   CT angiogram of the head and neck with contrast. 3-D postprocessing was performed and reviewed.  Evaluation for a significant carotid arterial stenosis is based on the NASCET criteria. Radiation dose reduction techniques included automated exposure  control or exposure modulation based on body size. Radiation audit for number of CT and nuclear cardiology exams performed in the last year:  0.        NECK: The aortic arch shows prominent atherosclerotic calcification. There is dense calcified atherosclerotic plaque at the origin of the left subclavian artery with a high-grade stenosis of approximately 60-70% diameter stenosis. The right vertebral  artery appears occluded at its origin.    There is scattered calcified atherosclerotic plaque of the left common carotid artery. Dense atherosclerotic plaque is demonstrated in the proximal left internal carotid artery causing a high-grade stenosis. The degree of stenosis is on the order of  70-80% diameter stenosis.    There is calcified atheromatous plaque at the origin of the right internal carotid artery with the degree of stenosis on the order of 50-60% diameter stenosis.    The patient demonstrates a dominant left vertebral. The right vertebral does not contribute to the basilar artery.    Head: The carotid siphons bilaterally show dense calcified plaque. The anterior and middle cerebral arteries demonstrate atherosclerotic irregularity but no convincing evidence of a flow-limiting stenosis. There is symmetric runoff bilaterally in the  anterior circulation. No obvious aneurysm is identified.    The basilar artery is of normal course and caliber.  There is atherosclerotic irregularity of both posterior cerebral arteries without evidence of a flow-limiting stenosis. There is symmetric runoff of the posterior cerebral arteries.    The dural venous sinuses appear patent.      Impression:         1.  High-grade stenosis at the origin of the left subclavian artery on the order of 60-70% diameter stenosis.  2.  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  3.  High-grade stenosis of the left internal carotid artery at its origin on the order of 70-80% diameter stenosis.  4.  Prominent calcified plaque at the origin of the right internal carotid artery with a stenosis on the order of 50-60% diameter stenosis.  5.  Prominent atherosclerotic irregularity of the intracranial arteries without convincing evidence of a flow-limiting stenosis or aneurysm.    MRI of the brain without contrast: 8/7/2019    INDICATION:   Sudden onset of dizziness    TECHNIQUE:   MRI of the brain without contrast.    COMPARISON:    None available.    FINDINGS:  The diffusion sequence demonstrates a large area of restricted diffusion in the inferior aspect of the right cerebellar hemisphere in the vascular distribution of the right PICA. There is no convincing evidence of hemorrhagic transformation.    The ventricles are generous in size. Cortical sulci are correspondingly prominent. There are no extra-axial fluid collections. No significant shift of midline structures. There is minimal mass effect on the right side of the lower fourth ventricle. The  FLAIR sequence demonstrates extensive areas of FLAIR hyperintensity in the periventricular white matter and subcortical white matter. This likely represents microvascular disease in this patient. The brainstem shows normal signal intensity.    The pituitary is within normal limits. The cervicomedullary junction is normal. The 7th and 8th cranial nerve complexes are within normal limits.    Mastoid air cells are clear. Mild  inflammatory change demonstrated in the maxillary sinuses and ethmoid air cells. No acute orbital findings.    IMPRESSION:   1.  Large area of restricted diffusion in the inferior aspect of the right cerebellar hemisphere representing an acute infarct in the vascular distribution of the right PICA. No evidence of hemorrhagic transformation.  2.  Prominent generalized cerebral atrophy and extensive white matter microvascular disease.    Signer Name: Damian Hinson MD   Signed: 8/8/2019 8:37 AM   Workstation Name: [id number]    [facility]    CT Angiogram Neck With & Without Contrast [[id number]] Collected:  08/07/19 2035     Updated:  08/08/19 0839    Narrative:       CTA Neck, CTA Head, MRI Brain WO     INDICATION:    Sudden onset of dizziness    Head and neck CTA:    Neck -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 There is some minimal intermittent flow within the right vertebral artery distally which may relate to collateral flow. Left vertebral artery patent with at least  moderate atherosclerotic change. The basilar artery is patent. Posterior cerebral arteries not well visualized or assessed but appear to be faintly patent with a threadlike appearance particularly on the right.    There is heavy atherosclerotic change of the common carotid arteries bilaterally, particularly at the carotid bulbs bilaterally. Common carotid arteries and internal carotid arteries do remain patent. There is heavy atherosclerotic change of the petrous  cavernous and supraclinoid internal carotid arteries bilaterally. Middle cerebral arteries patent bilaterally with a somewhat threadlike appearance along with the anterior cerebral arteries.    Brain MRI:  No hemorrhage. Recent/acute stroke involving the inferior medial right cerebellar hemisphere extending into the mid right cerebellar hemisphere posteriorly and slightly laterally. Mild edematous change. No distinct  hydrocephalus or midline shift.  Extensive periventricular white matter changes likely reflect sequela of chronic ischemic small vessel disease. No convincing evidence of hemorrhagic transformation at this time. No mass lesion.     This is a preliminary wet read performed by Juvenal Marquez M.D. Final interpretation will be provided by neuroradiology. Please refer to final interpretation for detailed findings and any further recommendations.    NOTIFICATION: Critical Value/emergent results were called by telephone at the time of interpretation on 8/7/2019 8:32 PM to BLAKE Thomas who verbally acknowledged these results.    Final interpretation by neuroradiology.    CTA of the head and neck:    TECHNIQUE:   CT angiogram of the head and neck with contrast. 3-D postprocessing was performed and reviewed.  Evaluation for a significant carotid arterial stenosis is based on the NASCET criteria. Radiation dose reduction techniques included automated exposure  control or exposure modulation based on body size. Radiation audit for number of CT and nuclear cardiology exams performed in the last year:  0.        NECK: The aortic arch shows prominent atherosclerotic calcification. There is dense calcified atherosclerotic plaque at the origin of the left subclavian artery with a high-grade stenosis of approximately 60-70% diameter stenosis. The right vertebral  artery appears occluded at its origin.    There is scattered calcified atherosclerotic plaque of the left common carotid artery. Dense atherosclerotic plaque is demonstrated in the proximal left internal carotid artery causing a high-grade stenosis. The degree of stenosis is on the order of  70-80% diameter stenosis.    There is calcified atheromatous plaque at the origin of the right internal carotid artery with the degree of stenosis on the order of 50-60% diameter stenosis.    The patient demonstrates a dominant left vertebral. The right vertebral does not contribute  to the basilar artery.    Head: The carotid siphons bilaterally show dense calcified plaque. The anterior and middle cerebral arteries demonstrate atherosclerotic irregularity but no convincing evidence of a flow-limiting stenosis. There is symmetric runoff bilaterally in the  anterior circulation. No obvious aneurysm is identified.    The basilar artery is of normal course and caliber. There is atherosclerotic irregularity of both posterior cerebral arteries without evidence of a flow-limiting stenosis. There is symmetric runoff of the posterior cerebral arteries.    The dural venous sinuses appear patent.      Impression:         1.  High-grade stenosis at the origin of the left subclavian artery on the order of 60-70% diameter stenosis.  2.  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  3.  High-grade stenosis of the left internal carotid artery at its origin on the order of 70-80% diameter stenosis.  4.  Prominent calcified plaque at the origin of the right internal carotid artery with a stenosis on the order of 50-60% diameter stenosis.  5.  Prominent atherosclerotic irregularity of the intracranial arteries without convincing evidence of a flow-limiting stenosis or aneurysm.    MRI of the brain without contrast: 8/7/2019    INDICATION:   Sudden onset of dizziness    TECHNIQUE:   MRI of the brain without contrast.    COMPARISON:    None available.    FINDINGS:  The diffusion sequence demonstrates a large area of restricted diffusion in the inferior aspect of the right cerebellar hemisphere in the vascular distribution of the right PICA. There is no convincing evidence of hemorrhagic transformation.    The ventricles are generous in size. Cortical sulci are correspondingly prominent. There are no extra-axial fluid collections. No significant shift of midline structures. There is minimal mass effect on the right side of the lower fourth ventricle. The  FLAIR sequence demonstrates extensive  areas of FLAIR hyperintensity in the periventricular white matter and subcortical white matter. This likely represents microvascular disease in this patient. The brainstem shows normal signal intensity.    The pituitary is within normal limits. The cervicomedullary junction is normal. The 7th and 8th cranial nerve complexes are within normal limits.    Mastoid air cells are clear. Mild inflammatory change demonstrated in the maxillary sinuses and ethmoid air cells. No acute orbital findings.    IMPRESSION:   1.  Large area of restricted diffusion in the inferior aspect of the right cerebellar hemisphere representing an acute infarct in the vascular distribution of the right PICA. No evidence of hemorrhagic transformation.  2.  Prominent generalized cerebral atrophy and extensive white matter microvascular disease.    Signer Name: Damian Hinson MD   Signed: 8/8/2019 8:37 AM   Workstation Name: [id number]    [facility]    MRI Brain Without Contrast [[id number]] Collected:  08/07/19 2035     Updated:  08/08/19 0839    Narrative:       CTA Neck, CTA Head, MRI Brain WO     INDICATION:    Sudden onset of dizziness    Head and neck CTA:    Neck -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 There is some minimal intermittent flow within the right vertebral artery distally which may relate to collateral flow. Left vertebral artery patent with at least  moderate atherosclerotic change. The basilar artery is patent. Posterior cerebral arteries not well visualized or assessed but appear to be faintly patent with a threadlike appearance particularly on the right.    There is heavy atherosclerotic change of the common carotid arteries bilaterally, particularly at the carotid bulbs bilaterally. Common carotid arteries and internal carotid arteries do remain patent. There is heavy atherosclerotic change of the petrous  cavernous and supraclinoid internal carotid arteries  bilaterally. Middle cerebral arteries patent bilaterally with a somewhat threadlike appearance along with the anterior cerebral arteries.    Brain MRI:  No hemorrhage. Recent/acute stroke involving the inferior medial right cerebellar hemisphere extending into the mid right cerebellar hemisphere posteriorly and slightly laterally. Mild edematous change. No distinct hydrocephalus or midline shift.  Extensive periventricular white matter changes likely reflect sequela of chronic ischemic small vessel disease. No convincing evidence of hemorrhagic transformation at this time. No mass lesion.     This is a preliminary wet read performed by Juvenal Marquez M.D. Final interpretation will be provided by neuroradiology. Please refer to final interpretation for detailed findings and any further recommendations.    NOTIFICATION: Critical Value/emergent results were called by telephone at the time of interpretation on 8/7/2019 8:32 PM to BLAKE Thomas who verbally acknowledged these results.    Final interpretation by neuroradiology.    CTA of the head and neck:    TECHNIQUE:   CT angiogram of the head and neck with contrast. 3-D postprocessing was performed and reviewed.  Evaluation for a significant carotid arterial stenosis is based on the NASCET criteria. Radiation dose reduction techniques included automated exposure  control or exposure modulation based on body size. Radiation audit for number of CT and nuclear cardiology exams performed in the last year:  0.        NECK: The aortic arch shows prominent atherosclerotic calcification. There is dense calcified atherosclerotic plaque at the origin of the left subclavian artery with a high-grade stenosis of approximately 60-70% diameter stenosis. The right vertebral  artery appears occluded at its origin.    There is scattered calcified atherosclerotic plaque of the left common carotid artery. Dense atherosclerotic plaque is demonstrated in the proximal left internal  carotid artery causing a high-grade stenosis. The degree of stenosis is on the order of  70-80% diameter stenosis.    There is calcified atheromatous plaque at the origin of the right internal carotid artery with the degree of stenosis on the order of 50-60% diameter stenosis.    The patient demonstrates a dominant left vertebral. The right vertebral does not contribute to the basilar artery.    Head: The carotid siphons bilaterally show dense calcified plaque. The anterior and middle cerebral arteries demonstrate atherosclerotic irregularity but no convincing evidence of a flow-limiting stenosis. There is symmetric runoff bilaterally in the  anterior circulation. No obvious aneurysm is identified.    The basilar artery is of normal course and caliber. There is atherosclerotic irregularity of both posterior cerebral arteries without evidence of a flow-limiting stenosis. There is symmetric runoff of the posterior cerebral arteries.    The dural venous sinuses appear patent.      Impression:         1.  High-grade stenosis at the origin of the left subclavian artery on the order of 60-70% diameter stenosis.  2.  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  3.  High-grade stenosis of the left internal carotid artery at its origin on the order of 70-80% diameter stenosis.  4.  Prominent calcified plaque at the origin of the right internal carotid artery with a stenosis on the order of 50-60% diameter stenosis.  5.  Prominent atherosclerotic irregularity of the intracranial arteries without convincing evidence of a flow-limiting stenosis or aneurysm.    MRI of the brain without contrast: 8/7/2019    INDICATION:   Sudden onset of dizziness    TECHNIQUE:   MRI of the brain without contrast.    COMPARISON:    None available.    FINDINGS:  The diffusion sequence demonstrates a large area of restricted diffusion in the inferior aspect of the right cerebellar hemisphere in the vascular distribution of the  right PICA. There is no convincing evidence of hemorrhagic transformation.    The ventricles are generous in size. Cortical sulci are correspondingly prominent. There are no extra-axial fluid collections. No significant shift of midline structures. There is minimal mass effect on the right side of the lower fourth ventricle. The  FLAIR sequence demonstrates extensive areas of FLAIR hyperintensity in the periventricular white matter and subcortical white matter. This likely represents microvascular disease in this patient. The brainstem shows normal signal intensity.    The pituitary is within normal limits. The cervicomedullary junction is normal. The 7th and 8th cranial nerve complexes are within normal limits.    Mastoid air cells are clear. Mild inflammatory change demonstrated in the maxillary sinuses and ethmoid air cells. No acute orbital findings.    IMPRESSION:   1.  Large area of restricted diffusion in the inferior aspect of the right cerebellar hemisphere representing an acute infarct in the vascular distribution of the right PICA. No evidence of hemorrhagic transformation.  2.  Prominent generalized cerebral atrophy and extensive white matter microvascular disease.    Signer Name: Damian Hinson MD   Signed: 8/8/2019 8:37 AM   Workstation Name: [id number]    [facility]    XR Chest PA & Lateral [[id number]] Collected:  08/07/19 2027     Updated:  08/07/19 2030    Narrative:       CR Chest 2 Vws    INDICATION:    Acute onset of dizziness while sweeping her porch today with associated vomiting.    COMPARISON:    7/6/2017    FINDINGS:   PA and lateral views of the chest.  There is mild cardiomegaly and tortuous descending thoracic aorta. The pulmonary vascularity is normal. The lungs are clear of acute densities. Stable calcified granuloma at the left base        Impression:       Mild cardiomegaly with tortuous descending thoracic aorta. The lungs are clear    Signer Name: Silvia  MD Jennifer   Signed: 8/7/2019 8:27 PM   Workstation Name: [id number]    [facility]          Results for orders placed during the hospital encounter of 08/07/19   Adult Transthoracic Echo Complete W/ Cont if Necessary Per Protocol (With Agitated Saline)    Narrative · Estimated EF = 75%.  · The cardiac valves are anatomically and functionally normal.  · Saline test results are negative.          I have reviewed the medications:  Scheduled Meds:  amLODIPine 5 mg Oral Daily   aspirin 325 mg Oral Daily   Or      aspirin 300 mg Rectal Daily   atorvastatin 80 mg Oral Nightly   clopidogrel 75 mg Oral Daily   FLUoxetine 20 mg Oral Daily   folic acid 1 mg Oral Daily   losartan 100 mg Oral Daily   sodium chloride 3 mL Intravenous Q12H   thiamine 100 mg Oral Daily     Continuous Infusions:   PRN Meds:.melatonin  •  ondansetron  •  sodium chloride  •  [COMPLETED] Insert peripheral IV **AND** sodium chloride  •  sodium chloride      Assessment/Plan   Assessment / Plan     Active Hospital Problems    Diagnosis  POA   • **Cerebellar stroke (CMS/HCC) [I63.9]  Yes   • Memory loss [R41.3]  Unknown   • Generalized anxiety disorder [F41.1]  Yes   • Depression [F32.9]  Yes   • PAD (peripheral artery disease) (CMS/HCC) [I73.9]  Yes   • Essential hypertension [I10]  Yes   • History of alcohol dependence (CMS/HCC) [F10.21]  Yes   • History of tobacco abuse [Z87.891]  Not Applicable   • Leukocytosis [D72.829]  Unknown      Resolved Hospital Problems   No resolved problems to display.        Brief Hospital Course to date:  Natasha Flores is a 69 y.o. female with hx of PAD, HTN, depression present for acute onset of dizziness and N/V.  Imaging shows acute cerebellar stroke.    - imaging reviewed.  Reviewed neuro consult.  ASA added to plavix  - echo pending  - statin added  - has PAD and scheduled for LLE stenting soon with [facility] doc.  Currently asymptomatic  - continue PT/OT, she is hopeful to go home if  improved.    DVT Prophylaxis:  heparin    Disposition: I expect the patient to be discharged home tomorrow if symptoms improved.    CODE STATUS:   Code Status and Medical Interventions:   Ordered at: 08/07/19 4124     Level Of Support Discussed With:    Patient     Code Status:    CPR     Medical Interventions (Level of Support Prior to Arrest):    Full       Electronically signed by Nahid Gallo MD, 08/08/19, 3:49 PM.

## 2019-08-08 NOTE — PLAN OF CARE
Problem: Patient Care Overview  Goal: Plan of Care Review   08/08/19 0916   Coping/Psychosocial   Plan of Care Reviewed With patient   Plan of Care Review   Progress no change   OTHER   Outcome Summary PT evaluation completed, Pt has dizziness when upright, fall precautions needed. Progress pt's gait training distance. Use AD.

## 2019-08-08 NOTE — THERAPY EVALUATION
Acute Care - Occupational Therapy Initial Evaluation  [facility]     Patient Name: Natasha Flores  : 1950  MRN: [id number]  Today's Date: 2019  Onset of Illness/Injury or Date of Surgery: 19  Date of Referral to OT: 19       Admit Date: 2019       ICD-10-CM ICD-9-CM   1. Cerebellar stroke (CMS/HCC) I63.9 434.91   2. Atherosclerotic occlusive disease I70.90 440.9   3. Essential hypertension I10 401.9   4. Impaired mobility and ADLs Z74.09 799.89     Patient Active Problem List   Diagnosis   • Cerebellar stroke (CMS/HCC)   • Generalized anxiety disorder   • Depression   • PAD (peripheral artery disease) (CMS/HCC)   • Essential hypertension   • History of alcohol dependence (CMS/HCC)   • History of tobacco abuse   • Leukocytosis   • Memory loss     Past Medical History:   Diagnosis Date   • Anxiety    • Anxiety    • Depression    • Hypertension    • PVD (peripheral vascular disease) (CMS/[facility])      Past Surgical History:   Procedure Laterality Date   •  SECTION     • LEG SURGERY      STENT PLACEMENT            OT ASSESSMENT FLOWSHEET (last 12 hours)      Occupational Therapy Evaluation     Row Name 19 1031                   OT Evaluation Time/Intention    Subjective Information  complains of;dizziness;nausea/vomiting  -AN        Document Type  evaluation  -AN        Mode of Treatment  occupational therapy  -AN        Patient Effort  adequate  -AN        Comment  Pt declined mobilty at this time due to dizziness/nausea  -AN           General Information    Patient Profile Reviewed?  yes  -AN        Onset of Illness/Injury or Date of Surgery  19  -AN        Patient Observations  alert;cooperative  -AN        Patient/Family Observations  no visitors present  -AN        General Observations of Patient  Pt reclined in chair  -AN        Prior Level of Function  independent:;all household mobility;community mobility;gait;transfer;ADL's;home management;driving takes care of pt  -AN         Equipment Currently Used at Home  grab bar  -AN        Pertinent History of Current Functional Problem  Pt to ED with acute onset of nausea/vomiting , dizziness, occipital HA and decreased balance. MRI shows R PICA infarct.  -AN        Existing Precautions/Restrictions  fall  -AN        Risks Reviewed  patient:;LOB;nausea/vomiting;dizziness;increased discomfort;change in vital signs  -AN        Benefits Reviewed  patient:;improve function;increase independence;increase strength;increase balance  -AN        Barriers to Rehab  none identified  -AN           Relationship/Environment    Lives With  parent(s)  -AN        Primary Roles/Responsibilities  caregiver for other(s)  -AN           Resource/Environmental Concerns    Current Living Arrangements  home/apartment/condo  -AN           Home Main Entrance    Number of Stairs, Main Entrance  one  -AN           Cognitive Assessment/Interventions    Additional Documentation  Cognitive Assessment/Intervention (Group)  -AN           Cognitive Assessment/Intervention- PT/OT    Affect/Mental Status (Cognitive)  WFL  -AN        Orientation Status (Cognition)  oriented x 4  -AN        Follows Commands (Cognition)  WFL  -AN           Bed Mobility Assessment/Treatment    Comment (Bed Mobility)  pt up in chair  -AN           Functional Mobility    Functional Mobility- Comment  declined  -AN           Transfer Assessment/Treatment    Comment (Transfers)  declined due to dizziness  -AN           General ROM    GENERAL ROM COMMENTS  Santosh UE WFL  -AN           MMT (Manual Muscle Testing)    General MMT Comments  santosh UE WFL, grossly 4+/5  -AN           Motor Assessment/Interventions    Additional Documentation  Balance (Group);Gross Motor Coordination (Group);Therapeutic Exercise (Group)  -AN           Gross Motor Coordination    Gross Motor Skill, Impairments Detail  WFL finger to nose  -AN           Sensory Assessment/Intervention    Sensory General Assessment  no sensation  deficits identified  -AN        Additional Documentation  Vision Assessment/Intervention (Group)  -AN           Vision Assessment/Intervention    Visual Impairment/Limitations  WFL;corrective lenses for reading slight brief horizontal nystagmus to R  -AN           Positioning and Restraints    Pre-Treatment Position  sitting in chair/recliner  -AN        Post Treatment Position  chair  -AN        In Chair  reclined;call light within reach;encouraged to call for assist;exit alarm on  -AN           Pain Scale: Numbers Pre/Post-Treatment    Pain Scale: Numbers, Pretreatment  0/10 - no pain  -AN        Pain Scale: Numbers, Post-Treatment  0/10 - no pain  -AN           Plan of Care Review    Plan of Care Reviewed With  patient  -AN           Clinical Impression (OT)    Date of Referral to OT  08/07/19  -AN        OT Diagnosis  Impaired ADL I  -AN        Patient/Family Goals Statement (OT Eval)  agreeable to OT  -AN        Criteria for Skilled Therapeutic Interventions Met (OT Eval)  yes;treatment indicated  -AN        Rehab Potential (OT Eval)  good, to achieve stated therapy goals  -AN        Therapy Frequency (OT Eval)  daily  -AN        Care Plan Review (OT)  care plan/treatment goals reviewed;risks/benefits reviewed  -AN        Anticipated Discharge Disposition (OT)  inpatient rehabilitation facility  -AN           Vital Signs    Pre Systolic BP Rehab  -- vitals stable  -AN           OT Goals    Transfer Goal Selection (OT)  transfer, OT goal 1  -AN        Bathing Goal Selection (OT)  bathing, OT goal 1  -AN        Dressing Goal Selection (OT)  dressing, OT goal 1  -AN        Balance Goal Selection (OT)  balance, OT goal 1  -AN        Additional Documentation  Balance Goal Selection (OT) (Row)  -AN           Transfer Goal 1 (OT)    Activity/Assistive Device (Transfer Goal 1, OT)  transfers, all  -AN        [place] Level/Cues Needed (Transfer Goal 1, OT)  contact guard assist  -AN        Time Frame (Transfer Goal  1, OT)  10 days  -AN        Progress/Outcome (Transfer Goal 1, OT)  goal ongoing  -AN           Bathing Goal 1 (OT)    Activity/Assistive Device (Bathing Goal 1, OT)  bathing skills, all;long-handled sponge;shower chair  -AN        [place] Level/Cues Needed (Bathing Goal 1, OT)  contact guard assist  -AN        Time Frame (Bathing Goal 1, OT)  10 days  -AN        Progress/Outcomes (Bathing Goal 1, OT)  goal ongoing  -AN           Dressing Goal 1 (OT)    Activity/Assistive Device (Dressing Goal 1, OT)  lower body dressing;reacher;sock-aid  -AN        [place]/Cues Needed (Dressing Goal 1, OT)  minimum assist (75% or more patient effort)  -AN        Time Frame (Dressing Goal 1, OT)  10 days  -AN        Progress/Outcome (Dressing Goal 1, OT)  goal ongoing  -AN           Balance Goal 1 (OT)    Activity/Assistive Device (Balance Goal 1, OT)  standing, dynamic with appropriate AD  -AN        [place] Level/Cues Needed (Balance Goal 1, OT)  contact guard assist  -AN        Time Frame (Balance Goal 1, OT)  10 days  -AN        Progress/Outcomes (Balance Goal 1, OT)  goal ongoing  -AN           Living Environment    Home Accessibility  tub/shower is not walk in;stairs to enter home  -AN          User Key  (r) = Recorded By, (t) = Taken By, (c) = Cosigned By    Initials Name Effective Dates    Liliana Romero, OT 06/22/15 -          Occupational Therapy Education     Title: PT OT SLP Therapies (In Progress)     Topic: Occupational Therapy (In Progress)     Point: ADL training (Done)     Description: Instruct learner(s) on proper safety adaptation and remediation techniques during self care or transfers.   Instruct in proper use of assistive devices.    Learning Progress Summary           Patient Acceptance, E,D, VU,NR by SAEID at 8/8/2019 10:31 AM    Comment:  Educated on current deficits, need for assist and OT POC.                               User Key     Initials Effective Dates Name Provider Type Discipline     SAEID 06/22/15 -  Liliana Odell OT Occupational Therapist OT                  OT Recommendation and Plan  Outcome Summary/Treatment Plan (OT)  Anticipated Discharge Disposition (OT): inpatient rehabilitation facility  Therapy Frequency (OT Eval): daily  Plan of Care Review  Plan of Care Reviewed With: patient  Plan of Care Reviewed With: patient  Outcome Summary: Pt with current deficits that have decreased ADL I. Pt with dizziness/nausea with any positional changes, resulting in decreased I with bending for ADLs. May need IRF if symptoms persist.    Outcome Measures     Row Name 08/08/19 1031             How much help from another is currently needed...    Putting on and taking off regular lower body clothing?  2  -AN      Bathing (including washing, rinsing, and drying)  2  -AN      Toileting (which includes using toilet bed pan or urinal)  3  -AN      Putting on and taking off regular upper body clothing  3  -AN      Taking care of personal grooming (such as brushing teeth)  4  -AN      Eating meals  4  -AN      AM-PAC 6 Clicks Score (OT)  18  -AN         Modified [place] Scale    Modified [place] Scale  4 - Moderately severe disability.  Unable to walk without assistance, and unable to attend to own bodily needs without assistance.  -AN         Functional Assessment    Outcome Measure Options  AM-PAC 6 Clicks Daily Activity (OT);Modified [place]  -AN        User Key  (r) = Recorded By, (t) = Taken By, (c) = Cosigned By    Initials Name Provider Type    Liliana Romero OT Occupational Therapist          Time Calculation:   Time Calculation- OT     Row Name 08/08/19 1108             Time Calculation- OT    OT Start Time  1031  -AN      Total Timed Code Minutes- OT  0 minute(s)  -AN      OT Received On  08/08/19  -AN      OT Goal Re-Cert Due Date  08/18/19  -AN        User Key  (r) = Recorded By, (t) = Taken By, (c) = Cosigned By    Initials Name Provider Type    Liliana Romero OT Occupational Therapist         Therapy Charges for Today     Code Description Service Date Service Provider Modifiers Qty    82846089197 HC OT EVAL LOW COMPLEXITY 3 8/8/2019 Liliana Odell, OT GO 1               Liliana Odell OT  8/8/2019

## 2019-08-08 NOTE — THERAPY EVALUATION
Acute Care - Speech Language Pathology Initial Evaluation  [facility]     Patient Name: Natasha Flores  : 1950  MRN: [id number]  Today's Date: 2019               Admit Date: 2019     Visit Dx:    ICD-10-CM ICD-9-CM   1. Cerebellar stroke (CMS/HCC) I63.9 434.91   2. Atherosclerotic occlusive disease I70.90 440.9   3. Essential hypertension I10 401.9     Patient Active Problem List   Diagnosis   • Cerebellar stroke (CMS/HCC)   • Generalized anxiety disorder   • Depression   • PAD (peripheral artery disease) (CMS/HCC)   • Essential hypertension   • History of alcohol dependence (CMS/HCC)   • History of tobacco abuse   • Leukocytosis   • Memory loss     Past Medical History:   Diagnosis Date   • Anxiety    • Anxiety    • Depression    • Hypertension    • PVD (peripheral vascular disease) (CMS/HCC)      Past Surgical History:   Procedure Laterality Date   •  SECTION     • LEG SURGERY      STENT PLACEMENT          SLP EVALUATION (last 72 hours)      SLP SLC Evaluation     Row Name 19 0907                   Communication Assessment/Intervention    Document Type  evaluation  -AW        Subjective Information  no complaints  -AW        Patient Observations  alert;cooperative  -AW        Patient/Family Observations  no family present  -AW        Patient Effort  good  -AW        Symptoms Noted During/After Treatment  none  -AW           General Information    Patient Profile Reviewed  yes  -AW        Pertinent History Of Current Problem  cerebellar stroke  -AW        Precautions/Limitations, Vision  WFL  -AW        Precautions/Limitations, Hearing  WFL  -AW        Prior Level of Function-Communication  WFL  -AW        Plans/Goals Discussed with  patient  -AW        Barriers to Rehab  none identified  -AW           Pain Assessment    Additional Documentation  Pain Scale: Numbers Pre/Post-Treatment (Group)  -AW           Comprehension Assessment/Intervention    Comprehension Assessment/Intervention   Auditory Comprehension  -           Auditory Comprehension Assessment/Intervention    Auditory Comprehension (Communication)  [facility]        Answers Questions (Communication)  yes/no;wh questions;personal;[facility]  -        Narrative Discourse  [facility]  -           Expression Assessment/Intervention    Expression Assessment/Intervention  verbal expression  -           Verbal Expression Assessment/Intervention    Verbal Expression  [facility]  -        Conversational Discourse/Fluency  [facility]  -           Oral Motor Structure and Function    Oral Motor Structure and Function  [facility]  -        Dentition Assessment  natural, present and adequate  -           Oral Musculature and Cranial Nerve Assessment    Oral Motor General Assessment  [facility]  -           Motor Speech Assessment/Intervention    Motor Speech Function  [facility]  -           Cursory Voice Assessment/Intervention    Quality and Resonance (Voice)  [facility]  -           Cognitive Assessment Intervention- SLP    Cognitive Function (Cognition)  [facility]        Orientation Status (Cognition)  [facility]           SLP Clinical Impressions    SLP Diagnosis  [facility]  -        SLC Criteria for Skilled Therapy Interventions Met  no problems identified which require skilled intervention  -           Recommendations    Therapy Frequency (SLP SLC)  evaluation only  -          User Key  (r) = Recorded By, (t) = Taken By, (c) = Cosigned By    Initials Name Effective Dates    Najma Rojas MS CCC-SLP 06/22/15 -              EDUCATION  The patient has been educated in the following areas:     Communication Impairment.    SLP Recommendation and Plan  SLP Diagnosis: [facility]     SLC Criteria for Skilled Therapy Interventions Met: no problems identified which require skilled intervention  SLP Diagnosis: WFL             Plan of Care Reviewed With: patient  Plan of Care Review  Plan of Care Reviewed With: patient  Progress: improving                  Time Calculation:     Time  Calculation- SLP     Row Name 08/08/19 0959             Time Calculation- SLP    SLP Start Time  0930  -AW      SLP Received On  08/08/19  -        User Key  (r) = Recorded By, (t) = Taken By, (c) = Cosigned By    Initials Name Provider Type    Najma Rojas, MS CCC-SLP Speech and Language Pathologist          Therapy Charges for Today     Code Description Service Date Service Provider Modifiers Qty    [id number]  ST EVAL SPEECH AND PROD W LANG  2 8/8/2019 Najma Larson, MS CCC-SLP GN 1                     Najma Larson MS CCC-SLP  8/8/2019

## 2019-08-08 NOTE — PROGRESS NOTES
Discharge Planning Assessment  [facility]     Patient Name: Natasha Flores  MRN: [id number]  Today's Date: 8/8/2019    Admit Date: 8/7/2019    Discharge Needs Assessment     Row Name 08/08/19 0828       Living Environment    Lives With  parent(s)    Name(s) of Who Lives With Patient  Mother in [place]    Current Living Arrangements  home/apartment/condo    Primary Care Provided by  self    Provides Primary Care For  no one    Family Caregiver if Needed  child(schuyler), adult    Family Caregiver Names  Umesh Jeffery (daughter) [phone]    Able to Return to Prior Arrangements  yes       Resource/Environmental Concerns    Resource/Environmental Concerns  none    Transportation Concerns  car, none       Transition Planning    Patient/Family Anticipates Transition to  home with help/services    Patient/Family Anticipated Services at Transition  none    Transportation Anticipated  family or friend will provide       Discharge Needs Assessment    Readmission Within the Last 30 Days  no previous admission in last 30 days    Concerns to be Addressed  denies needs/concerns at this time    Equipment Currently Used at Home  none    Anticipated Changes Related to Illness  none    Equipment Needed After Discharge  none    Discharge Facility/Level of Care Needs  home with home health    Offered/Gave Vendor List  no        Discharge Plan     Row Name 08/08/19 0830       Plan    Plan  Home w/ family    Patient/Family in Agreement with Plan  yes    Plan Comments  Spoke w/ Patient and daughter at bedside. Plan is home w/ mother in [place]. Caregiver is Umesh Jeffery (daughter) [phone]. Is independent w/ ADL's. No problems w/ insurance or medications. No DME at home. Denies any needs at this time. CM will continue to follow.    Final Discharge Disposition Code  01 - home or self-care        Destination      No service coordination in this encounter.      Durable Medical Equipment      No service coordination in this  encounter.      Dialysis/Infusion      No service coordination in this encounter.      Home Medical Care      No service coordination in this encounter.      Therapy      No service coordination in this encounter.      Community Resources      No service coordination in this encounter.          Demographic Summary     Row Name 08/08/19 0827       General Information    Admission Type  inpatient    Arrived From  emergency department    Referral Source  admission list    Reason for Consult  discharge planning    Preferred Language  English     Used During This Interaction  no       Contact Information    Permission Granted to Share Info With      Contact Information Obtained for      Contact Information Comments  PCP is Jeffrey Ca MD           Name,   Tony Del Rio    Phone,   [phone]        Functional Status     Row Name 08/08/19 0828       Functional Status    Usual Activity Tolerance  good    Current Activity Tolerance  moderate       Functional Status, IADL    Medications  independent    Meal Preparation  independent    Housekeeping  independent    Laundry  independent    Shopping  independent       Mental Status    General Appearance WDL  WDL       Mental Status Summary    Recent Changes in Mental Status/Cognitive Functioning  no changes       Employment/    Employment Status  retired        Psychosocial    No documentation.       Abuse/Neglect    No documentation.       Legal    No documentation.       Substance Abuse    No documentation.       Patient Forms    No documentation.           Kennedy Del Rio RN

## 2019-08-08 NOTE — PLAN OF CARE
Problem: Patient Care Overview  Goal: Plan of Care Review  Outcome: Ongoing (interventions implemented as appropriate)   08/08/19 0976   Coping/Psychosocial   Plan of Care Reviewed With patient   Plan of Care Review   Progress improving   SLP evaluation completed. Will sign-off communication - pt with no changes in speech or language function. No SLP needs identified at this time. Please see note for further details and recommendations.

## 2019-08-09 ENCOUNTER — APPOINTMENT (OUTPATIENT)
Dept: CARDIOLOGY | Facility: HOSPITAL | Age: 69
End: 2019-08-09

## 2019-08-09 LAB
BH CV XLRA MEAS LEFT CCA RATIO VEL: 94.9 CM/SEC
BH CV XLRA MEAS LEFT DIST CCA EDV: 25.3 CM/SEC
BH CV XLRA MEAS LEFT DIST CCA PSV: 151.5 CM/SEC
BH CV XLRA MEAS LEFT DIST ICA EDV: 28.3 CM/SEC
BH CV XLRA MEAS LEFT DIST ICA PSV: 102.5 CM/SEC
BH CV XLRA MEAS LEFT ICA RATIO VEL: 133 CM/SEC
BH CV XLRA MEAS LEFT ICA/CCA RATIO: 1.4
BH CV XLRA MEAS LEFT MID CCA EDV: 23.3 CM/SEC
BH CV XLRA MEAS LEFT MID CCA PSV: 95.5 CM/SEC
BH CV XLRA MEAS LEFT MID ICA EDV: 26.2 CM/SEC
BH CV XLRA MEAS LEFT MID ICA PSV: 125.7 CM/SEC
BH CV XLRA MEAS LEFT PROX CCA EDV: 25.1 CM/SEC
BH CV XLRA MEAS LEFT PROX CCA PSV: 95 CM/SEC
BH CV XLRA MEAS LEFT PROX ECA EDV: 14.7 CM/SEC
BH CV XLRA MEAS LEFT PROX ECA PSV: 156.2 CM/SEC
BH CV XLRA MEAS LEFT PROX ICA EDV: 16.8 CM/SEC
BH CV XLRA MEAS LEFT PROX ICA PSV: 162 CM/SEC
BH CV XLRA MEAS LEFT PROX SCLA PSV: 135 CM/SEC
BH CV XLRA MEAS LEFT VERTEBRAL A EDV: 19.5 CM/SEC
BH CV XLRA MEAS LEFT VERTEBRAL A PSV: 55.9 CM/SEC
BH CV XLRA MEAS RIGHT CCA RATIO VEL: 119 CM/SEC
BH CV XLRA MEAS RIGHT DIST CCA EDV: 21.6 CM/SEC
BH CV XLRA MEAS RIGHT DIST CCA PSV: 119.4 CM/SEC
BH CV XLRA MEAS RIGHT DIST ICA EDV: 33.5 CM/SEC
BH CV XLRA MEAS RIGHT DIST ICA PSV: 118.7 CM/SEC
BH CV XLRA MEAS RIGHT ICA RATIO VEL: 128 CM/SEC
BH CV XLRA MEAS RIGHT ICA/CCA RATIO: 1.1
BH CV XLRA MEAS RIGHT MID CCA EDV: 22.8 CM/SEC
BH CV XLRA MEAS RIGHT MID CCA PSV: 119.4 CM/SEC
BH CV XLRA MEAS RIGHT MID ICA EDV: 32.8 CM/SEC
BH CV XLRA MEAS RIGHT MID ICA PSV: 128.5 CM/SEC
BH CV XLRA MEAS RIGHT PROX CCA EDV: 21.6 CM/SEC
BH CV XLRA MEAS RIGHT PROX CCA PSV: 163 CM/SEC
BH CV XLRA MEAS RIGHT PROX ECA EDV: 14.6 CM/SEC
BH CV XLRA MEAS RIGHT PROX ECA PSV: 190.8 CM/SEC
BH CV XLRA MEAS RIGHT PROX ICA EDV: 41.9 CM/SEC
BH CV XLRA MEAS RIGHT PROX ICA PSV: 135.5 CM/SEC
BH CV XLRA MEAS RIGHT PROX SCLA PSV: 169 CM/SEC

## 2019-08-09 PROCEDURE — 97116 GAIT TRAINING THERAPY: CPT

## 2019-08-09 PROCEDURE — 93880 EXTRACRANIAL BILAT STUDY: CPT | Performed by: INTERNAL MEDICINE

## 2019-08-09 PROCEDURE — 93880 EXTRACRANIAL BILAT STUDY: CPT

## 2019-08-09 PROCEDURE — 99233 SBSQ HOSP IP/OBS HIGH 50: CPT | Performed by: INTERNAL MEDICINE

## 2019-08-09 PROCEDURE — 99221 1ST HOSP IP/OBS SF/LOW 40: CPT | Performed by: PHYSICIAN ASSISTANT

## 2019-08-09 PROCEDURE — 97110 THERAPEUTIC EXERCISES: CPT

## 2019-08-09 PROCEDURE — 97530 THERAPEUTIC ACTIVITIES: CPT

## 2019-08-09 PROCEDURE — 25010000002 HEPARIN (PORCINE) PER 1000 UNITS: Performed by: INTERNAL MEDICINE

## 2019-08-09 PROCEDURE — 99232 SBSQ HOSP IP/OBS MODERATE 35: CPT | Performed by: PSYCHIATRY & NEUROLOGY

## 2019-08-09 RX ORDER — ACETAMINOPHEN 325 MG/1
650 TABLET ORAL EVERY 6 HOURS PRN
Status: DISCONTINUED | OUTPATIENT
Start: 2019-08-09 | End: 2019-08-12 | Stop reason: HOSPADM

## 2019-08-09 RX ADMIN — MELATONIN TAB 5 MG 5 MG: 5 TAB at 20:48

## 2019-08-09 RX ADMIN — FLUOXETINE HYDROCHLORIDE 20 MG: 20 CAPSULE ORAL at 08:20

## 2019-08-09 RX ADMIN — ACETAMINOPHEN 650 MG: 325 TABLET ORAL at 14:45

## 2019-08-09 RX ADMIN — CLOPIDOGREL BISULFATE 75 MG: 75 TABLET ORAL at 08:20

## 2019-08-09 RX ADMIN — HEPARIN SODIUM 5000 UNITS: 5000 INJECTION INTRAVENOUS; SUBCUTANEOUS at 06:39

## 2019-08-09 RX ADMIN — FOLIC ACID 1 MG: 1 TABLET ORAL at 08:21

## 2019-08-09 RX ADMIN — ATORVASTATIN CALCIUM 80 MG: 40 TABLET, FILM COATED ORAL at 20:48

## 2019-08-09 RX ADMIN — ASPIRIN 325 MG ORAL TABLET 325 MG: 325 PILL ORAL at 08:20

## 2019-08-09 RX ADMIN — HEPARIN SODIUM 5000 UNITS: 5000 INJECTION INTRAVENOUS; SUBCUTANEOUS at 13:57

## 2019-08-09 RX ADMIN — SODIUM CHLORIDE, PRESERVATIVE FREE 3 ML: 5 INJECTION INTRAVENOUS at 20:48

## 2019-08-09 RX ADMIN — Medication 100 MG: at 08:20

## 2019-08-09 RX ADMIN — SODIUM CHLORIDE, PRESERVATIVE FREE 3 ML: 5 INJECTION INTRAVENOUS at 08:20

## 2019-08-09 RX ADMIN — LOSARTAN POTASSIUM 100 MG: 50 TABLET ORAL at 08:20

## 2019-08-09 RX ADMIN — AMLODIPINE BESYLATE 5 MG: 5 TABLET ORAL at 08:20

## 2019-08-09 NOTE — PROGRESS NOTES
Continued Stay Note  Psychiatric     Patient Name: Natasha Flores  MRN: [id number]  Today's Date: 8/9/2019    Admit Date: 8/7/2019    Discharge Plan     Row Name 08/09/19 0806       Plan    Plan  Home w/ family    Patient/Family in Agreement with Plan  yes    Plan Comments  Spoke w/ patient at bedside. She is hoping to go home today. Denies any needs at this time. CM will continue to follow.    Final Discharge Disposition Code  01 - home or self-care        Discharge Codes    No documentation.             Kennedy Del Rio RN

## 2019-08-09 NOTE — PROGRESS NOTES
[facility]  PROGRESS NOTE    Patient Name: Natasha Flores  : 1950  MRN: [id number]    Date of Admission: 2019  Length of Stay: 2  Primary Care Physician: Jeffrey Ca MD    Subjective   Subjective     CC:  F/u stroke    HPI:  Still having some trouble getting around.  Open to rehab.    Review of Systems  Gen- No fevers, chills  CV- No chest pain, palpitations  Resp- No cough, dyspnea  GI- No N/V/D, abd pain    Otherwise ROS is negative except as mentioned in the HPI.    Objective   Objective     Vital Signs:   Temp:  [97.8 °F (36.6 °C)-98.2 °F (36.8 °C)] 98.2 °F (36.8 °C)  Heart Rate:  [62-86] 68  Resp:  [16-18] 16  BP: (118-140)/(62-78) 118/67  Total (NIH Stroke Scale): 0     Physical Exam:  Constitutional: No acute distress, awake, alert, sitting up in chair  HENT: NCAT, mucous membranes moist  Respiratory: Clear to auscultation bilaterally, respiratory effort normal   Cardiovascular: RRR, no murmurs, rubs, or gallops   Gastrointestinal: Positive bowel sounds, soft, nontender, nondistended  Musculoskeletal: No bilateral ankle edema  Psychiatric: Appropriate affect, cooperative  Neurologic: Oriented x 3, speech normal, gait not observed  Skin: No rashes    Results Reviewed:  I have personally reviewed current lab, radiology, and data and agree.    Results from last 7 days   Lab Units 19  0753 19  1833   WBC 10*3/mm3 10.68 15.73*   HEMOGLOBIN g/dL 12.3 13.4   HEMATOCRIT % 37.5 41.3   PLATELETS 10*3/mm3 290 338   INR   --  0.98     Results from last 7 days   Lab Units 19  0753 19  1833   SODIUM mmol/L 140 139   POTASSIUM mmol/L 4.0 4.4   CHLORIDE mmol/L 103 104   CO2 mmol/L 24.0 22.0   BUN mg/dL 18 22   CREATININE mg/dL 0.71 0.85   GLUCOSE mg/dL 97 129*   CALCIUM mg/dL 10.1 9.9   ALT (SGPT) U/L 8 9   AST (SGOT) U/L 14 17     Estimated Creatinine Clearance: 56.5 mL/min (by C-G formula based on SCr of 0.71 mg/dL).    Microbiology Results  Abnormal     None          Imaging Results (last 24 hours)     ** No results found for the last 24 hours. **          Results for orders placed during the hospital encounter of 08/07/19   Adult Transthoracic Echo Complete W/ Cont if Necessary Per Protocol (With Agitated Saline)    Narrative · Estimated EF = 75%.  · The cardiac valves are anatomically and functionally normal.  · Saline test results are negative.          I have reviewed the medications:  Scheduled Meds:    amLODIPine 5 mg Oral Daily   aspirin 325 mg Oral Daily   Or      aspirin 300 mg Rectal Daily   atorvastatin 80 mg Oral Nightly   clopidogrel 75 mg Oral Daily   FLUoxetine 20 mg Oral Daily   folic acid 1 mg Oral Daily   heparin (porcine) 5,000 Units Subcutaneous Q8H   losartan 100 mg Oral Daily   sodium chloride 3 mL Intravenous Q12H   thiamine 100 mg Oral Daily     Continuous Infusions:   PRN Meds:.•  acetaminophen  •  melatonin  •  ondansetron  •  sodium chloride  •  [COMPLETED] Insert peripheral IV **AND** sodium chloride  •  sodium chloride      Assessment/Plan   Assessment / Plan     Active Hospital Problems    Diagnosis  POA   • **Cerebellar stroke (CMS/HCC) [I63.9]  Yes   • Memory loss [R41.3]  Unknown   • Generalized anxiety disorder [F41.1]  Yes   • Depression [F32.9]  Yes   • PAD (peripheral artery disease) (CMS/HCC) [I73.9]  Yes   • Essential hypertension [I10]  Yes   • History of alcohol dependence (CMS/HCC) [F10.21]  Yes   • History of tobacco abuse [Z87.891]  Not Applicable   • Leukocytosis [D72.829]  Unknown      Resolved Hospital Problems   No resolved problems to display.        Brief Hospital Course to date:  Natasha Flores is a 69 y.o. female with hx of PAD, HTN, depression present for acute onset of dizziness and N/V.  Imaging shows acute cerebellar stroke.    - Evaluated by neurology.    - Continue ASA and plavix, statin  - echo shows normal LVEF, negative bubble study  - Follow up with Dr. Restrepo in 1 month for bilateral carotid  stenosis with duplex  - has PAD and scheduled for LLE stenting soon with [facility] doc.  Currently asymptomatic  - continue PT/OT, probably needs inpatient rehab based on progress    DVT Prophylaxis:  heparin    Disposition: I expect the patient to be discharged to rehab when a bed is available    CODE STATUS:   Code Status and Medical Interventions:   Ordered at: 08/07/19 7782     Level Of Support Discussed With:    Patient     Code Status:    CPR     Medical Interventions (Level of Support Prior to Arrest):    Full       Electronically signed by Lottie Aleman MD, 08/09/19, 5:50 PM.

## 2019-08-09 NOTE — PLAN OF CARE
Problem: Patient Care Overview  Goal: Plan of Care Review  Outcome: Ongoing (interventions implemented as appropriate)   08/09/19 4271   Coping/Psychosocial   Plan of Care Reviewed With patient   Plan of Care Review   Progress improving   OTHER   Outcome Summary Pt with noted improvement in mobility and ADL's. Pt is CGA for mobilty, LB ADL's. Pt c/o dizziness with mobility >25 ft. Address home safety, vestibular exercises. Recommend IRF at discharge, pt in agreement.

## 2019-08-09 NOTE — THERAPY TREATMENT NOTE
Acute Care - Occupational Therapy Treatment Note  [facility]     Patient Name: Natasha Flores  : 1950  MRN: [id number]  Today's Date: 2019  Onset of Illness/Injury or Date of Surgery: 19  Date of Referral to OT: 19       Admit Date: 2019       ICD-10-CM ICD-9-CM   1. Cerebellar stroke (CMS/HCC) I63.9 434.91   2. Atherosclerotic occlusive disease I70.90 440.9   3. Essential hypertension I10 401.9   4. Impaired mobility and ADLs Z74.09 799.89     Patient Active Problem List   Diagnosis   • Cerebellar stroke (CMS/HCC)   • Generalized anxiety disorder   • Depression   • PAD (peripheral artery disease) (CMS/[facility])   • Essential hypertension   • History of alcohol dependence (CMS/[facility])   • History of tobacco abuse   • Leukocytosis   • Memory loss     Past Medical History:   Diagnosis Date   • Anxiety    • Anxiety    • Depression    • Hypertension    • PVD (peripheral vascular disease) (CMS/[facility])      Past Surgical History:   Procedure Laterality Date   •  SECTION     • LEG SURGERY      STENT PLACEMENT         Therapy Treatment    Rehabilitation Treatment Summary     Row Name 19 1524             Treatment Time/Intention    Discipline  occupational therapist  -AN      Document Type  therapy note (daily note)  -AN      Subjective Information  no complaints  -AN      Mode of Treatment  occupational therapy  -AN      Patient/Family Observations  pt in bed Fowlers, watching TV, exit alarm; daughter present  -AN      Care Plan Review  care plan/treatment goals reviewed;risks/benefits reviewed  -AN      Care Plan Review, Other Participant(s)  caregiver  -AN      Patient Effort  good  -AN      Comment  dizziness following ~ 25 feet of mobility  -AN      Existing Precautions/Restrictions  fall  -AN      Treatment Considerations/Comments  dizziness, nausea  -AN      Patient Response to Treatment  may benefit from LB ADL AE  -AN      Recorded by [AN] Liliana Odell OT 19 3596      Row Name  08/09/19 1524             Vital Signs    Pre Systolic BP Rehab  118  -AN      Pre Treatment Diastolic BP  67  -AN      Post Systolic BP Rehab  138  -AN      Post Treatment Diastolic BP  77  -AN      Recorded by [AN] Liliana Odell OT 08/09/19 1601      Row Name 08/09/19 1524             Cognitive Assessment/Intervention    Additional Documentation  Cognitive Assessment/Intervention (Group)  -AN      Recorded by [AN] Liliana Odell OT 08/09/19 1601      Row Name 08/09/19 1524             Cognitive Assessment/Intervention- PT/OT    Affect/Mental Status (Cognitive)  WFL  -AN      Orientation Status (Cognition)  oriented x 4  -AN      Follows Commands (Cognition)  WFL  -AN      Cognitive Function (Cognitive)  safety deficit  -AN      Safety Deficit (Cognitive)  mild deficit;insight into deficits/self awareness  -AN      Personal Safety Interventions  fall prevention program maintained  -AN      Recorded by [AN] Liliana Odell OT 08/09/19 1601      Row Name 08/09/19 1524             Bed Mobility Assessment/Treatment    Bed Mobility Assessment/Treatment  supine-sit;sit-supine  -AN      [place] Level (Bed Mobility)  supervision  -AN      Supine-Sit [place] (Bed Mobility)  supervision  -AN      Sit-Supine [place] (Bed Mobility)  supervision  -AN      Assistive Device (Bed Mobility)  head of bed elevated  -AN      Recorded by [AN] Liliana Odell OT 08/09/19 1601      Row Name 08/09/19 1524             Functional Mobility    Functional Mobility- Ind. Level  contact guard assist  -AN      Functional Mobility- Device  rolling walker  -AN      Functional Mobility-Distance (Feet)  50  -AN      Functional Mobility- Comment  unsteady, cues for RW safety  -AN      Recorded by [AN] Liliana Odell OT 08/09/19 1601      Row Name 08/09/19 1524             Transfer Assessment/Treatment    Transfer Assessment/Treatment  sit-stand transfer;stand-sit transfer;shower transfer  -AN      Recorded by [AN] Liliana Odell, OT  08/09/19 1601      Row Name 08/09/19 1524             Sit-Stand Transfer    Sit-Stand [place] (Transfers)  supervision  -AN      Assistive Device (Sit-Stand Transfers)  walker, front-wheeled  -AN      Recorded by [AN] Liliana Odell, OT 08/09/19 1601      Row Name 08/09/19 1524             Stand-Sit Transfer    Stand-Sit [place] (Transfers)  verbal cues;contact guard  -AN      Assistive Device (Stand-Sit Transfers)  walker, front-wheeled cues to finish turning for safe sitting  -AN      Recorded by [AN] Liliana Odell, OT 08/09/19 1601      Row Name 08/09/19 1524             Shower Transfer    Type (Shower Transfer)  lateral simulated  -AN      [place] Level (Shower Transfer)  contact guard  -AN      Assistive Device (Shower Transfer)  grab bars/tub rail;walker, front-wheeled  -AN      Recorded by [AN] Liliana Odell, OT 08/09/19 1601      Row Name 08/09/19 1524             ADL Assessment/Intervention    BADL Assessment/Intervention  lower body dressing  -AN      Recorded by [AN] Liliana Odell, OT 08/09/19 1601      Row Name 08/09/19 1524             Lower Body Dressing Assessment/Training    Lower Body Dressing [place] Level  don;pants/bottoms;contact guard assist  -AN      Lower Body Dressing Position  edge of bed sitting  -AN      Recorded by [AN] Liliana Odell, OT 08/09/19 1601      Row Name 08/09/19 1524             BADL Safety/Performance    Impairments, BADL Safety/Performance  balance  -AN      Recorded by [AN] Liliana Odell, OT 08/09/19 1601      Row Name 08/09/19 1524             Motor Skills Assessment/Interventions    Additional Documentation  Therapeutic Exercise (Group)  -AN      Recorded by [AN] Liliana Odell, OT 08/09/19 1605      Row Name 08/09/19 1524             Therapeutic Exercise    59589 - OT Therapeutic Activity Minutes  24  -AN      Recorded by [AN] Liliana Odell, OT 08/09/19 1605      Row Name 08/09/19 1524             Static Standing Balance    Level of  [place] (Supported Standing, Static Balance)  supervision  -AN      Assistive Device Utilized (Supported Standing, Static Balance)  walker, rolling  -AN      Recorded by [AN] Liliana Odell, OT 08/09/19 1601      Row Name 08/09/19 1524             Dynamic Standing Balance    Level of [place], Reaches Outside Midline (Standing, Dynamic Balance)  contact guard assist  -AN      Assistive Device Utilized (Supported Standing, Dynamic Balance)  walker, rolling  -AN      Recorded by [AN] Liliana Odell, OT 08/09/19 1601      Row Name 08/09/19 1524             Positioning and Restraints    Pre-Treatment Position  in bed  -AN      Post Treatment Position  bed  -AN      In Bed  supine;call light within reach;encouraged to call for assist  -AN      Recorded by [AN] Liliana Odell, OT 08/09/19 1601      Row Name 08/09/19 1524             Sensory Assessment/Intervention    Additional Documentation  Vision Assessment/Intervention (Group)  -AN      Recorded by [AN] Liliana Odell, OT 08/09/19 1601      Row Name 08/09/19 1524             Vision Assessment/Intervention    Impact of Vision Impairment on Function (Vision)  Head, eye and head + eye ex; pt dizziness with vertical head nods, recommended do 1x day when in bed, not getting up , not eating.  -AN      Recorded by [AN] Liliana Odell, OT 08/09/19 1601      Row Name 08/09/19 1524             Plan of Care Review    Plan of Care Reviewed With  patient  -AN      Recorded by [AN] Liliana Odell, OT 08/09/19 1601      Row Name 08/09/19 1524             Outcome Summary/Treatment Plan (OT)    Daily Summary of Progress (OT)  progress toward functional goals is good  -AN      Anticipated Discharge Disposition (OT)  inpatient rehabilitation facility  -AN      Recorded by [AN] Liliana Odell, OT 08/09/19 1601        User Key  (r) = Recorded By, (t) = Taken By, (c) = Cosigned By    Initials Name Effective Dates Discipline    AN Liliana Odell, OT 06/22/15 -  OT              Occupational Therapy Education     Title: PT OT SLP Therapies (In Progress)     Topic: Occupational Therapy (Done)     Point: ADL training (Done)     Description: Instruct learner(s) on proper safety adaptation and remediation techniques during self care or transfers.   Instruct in proper use of assistive devices.    Learning Progress Summary           Patient Acceptance, E,D, VU,NR by AN at 8/9/2019  3:24 PM    Comment:  Educated on current deficits, safety concerns, discharge planning. Educated on head/eye ex sitting for vestibular deficits.    Acceptance, E,TB, VU,NR by SABA at 8/9/2019  2:20 AM    Acceptance, E,D, VU,NR by AN at 8/8/2019 10:31 AM    Comment:  Educated on current deficits, need for assist and OT POC.   Family Acceptance, E,D, VU,NR by SAEID at 8/9/2019  3:24 PM    Comment:  Educated on current deficits, safety concerns, discharge planning. Educated on head/eye ex sitting for vestibular deficits.                   Point: Home exercise program (Done)     Description: Instruct learner(s) on appropriate technique for monitoring, assisting and/or progressing therapeutic exercises/activities.    Learning Progress Summary           Patient Acceptance, E,TB, VU,NR by SABA at 8/9/2019  2:20 AM                   Point: Precautions (Done)     Description: Instruct learner(s) on prescribed precautions during self-care and functional transfers.    Learning Progress Summary           Patient Acceptance, E,TB, VU,NR by SABA at 8/9/2019  2:20 AM                   Point: Body mechanics (Done)     Description: Instruct learner(s) on proper positioning and spine alignment during self-care, functional mobility activities and/or exercises.    Learning Progress Summary           Patient Acceptance, E,TB, VU,NR by SABA at 8/9/2019  2:20 AM                               User Key     Initials Effective Dates Name Provider Type Discipline    AN 06/22/15 -  Liliana Odell OT Occupational Therapist OT    SABA 07/24/19 -  Cris  Amanda RN Registered Nurse Nurse                OT Recommendation and Plan  Outcome Summary/Treatment Plan (OT)  Daily Summary of Progress (OT): progress toward functional goals is good  Anticipated Discharge Disposition (OT): inpatient rehabilitation facility  Therapy Frequency (OT Eval): daily  Daily Summary of Progress (OT): progress toward functional goals is good  Plan of Care Review  Plan of Care Reviewed With: patient  Plan of Care Reviewed With: patient  Outcome Summary: Pt with noted improvement in mobility and ADL's. Pt is CGA for mobilty, LB ADL's. Pt c/o dizziness with mobility >25 ft. Address home safety, vestibular exercises. Recommend IRF at discharge, pt in agreement.  Outcome Measures     Row Name 08/09/19 1131 08/08/19 1031          How much help from another is currently needed...    Putting on and taking off regular lower body clothing?  3  -AN  2  -AN     Bathing (including washing, rinsing, and drying)  2  -AN  2  -AN     Toileting (which includes using toilet bed pan or urinal)  3  -AN  3  -AN     Putting on and taking off regular upper body clothing  3  -AN  3  -AN     Taking care of personal grooming (such as brushing teeth)  4  -AN  4  -AN     Eating meals  4  -AN  4  -AN     AM-PAC 6 Clicks Score (OT)  19  -AN  18  -AN        Modified [place] Scale    Modified [place] Scale  4 - Moderately severe disability.  Unable to walk without assistance, and unable to attend to own bodily needs without assistance.  -AN  4 - Moderately severe disability.  Unable to walk without assistance, and unable to attend to own bodily needs without assistance.  -AN        Functional Assessment    Outcome Measure Options  --  AM-PAC 6 Clicks Daily Activity (OT);Modified [place]  -AN       User Key  (r) = Recorded By, (t) = Taken By, (c) = Cosigned By    Initials Name Provider Type    Liliana Romero, OT Occupational Therapist           Time Calculation:   Time Calculation- OT     Row Name 08/09/19 1605 08/09/19  1524          Time Calculation- OT    OT Start Time  1524  -AN  --     Total Timed Code Minutes- OT  24 minute(s)  -AN  --     OT Received On  08/09/19  -AN  --     OT Goal Re-Cert Due Date  08/18/19  -AN  --        Timed Charges    17822 - OT Therapeutic Activity Minutes  --  24  -AN       User Key  (r) = Recorded By, (t) = Taken By, (c) = Cosigned By    Initials Name Provider Type    AN Liliana Odell OT Occupational Therapist        Therapy Charges for Today     Code Description Service Date Service Provider Modifiers Qty    [id number]  OT EVAL LOW COMPLEXITY 3 8/8/2019 Liliana Odell, OT GO 1    [id number]  OT THERAPEUTIC ACT EA 15 MIN 8/9/2019 Liliana Odell OT GO 2               Liliana Odell OT  8/9/2019

## 2019-08-09 NOTE — THERAPY TREATMENT NOTE
Patient Name: Natasha Flores  : 1950    MRN: [id number]                              Today's Date: 2019       Admit Date: 2019    Visit Dx:     ICD-10-CM ICD-9-CM   1. Cerebellar stroke (CMS/HCC) I63.9 434.91   2. Atherosclerotic occlusive disease I70.90 440.9   3. Essential hypertension I10 401.9   4. Impaired mobility and ADLs Z74.09 799.89     Patient Active Problem List   Diagnosis   • Cerebellar stroke (CMS/HCC)   • Generalized anxiety disorder   • Depression   • PAD (peripheral artery disease) (CMS/[facility])   • Essential hypertension   • History of alcohol dependence (CMS/[facility])   • History of tobacco abuse   • Leukocytosis   • Memory loss     Past Medical History:   Diagnosis Date   • Anxiety    • Anxiety    • Depression    • Hypertension    • PVD (peripheral vascular disease) (CMS/[facility])      Past Surgical History:   Procedure Laterality Date   •  SECTION     • LEG SURGERY      STENT PLACEMENT       General Information     Row Name 19 1125          PT Evaluation Time/Intention    Document Type  therapy note (daily note)  -AS     Mode of Treatment  physical therapy  -AS     Row Name 19 1125          General Information    Patient Profile Reviewed?  yes  -AS     Existing Precautions/Restrictions  fall right sided weakness  -AS     Row Name 19 1125          Cognitive Assessment/Intervention- PT/OT    Orientation Status (Cognition)  oriented x 4  -AS     Row Name 19 1125          Safety Issues, Functional Mobility    Safety Issues Affecting Function (Mobility)  safety precaution awareness  -AS     Impairments Affecting Function (Mobility)  balance;strength  -AS       User Key  (r) = Recorded By, (t) = Taken By, (c) = Cosigned By    Initials Name Provider Type    AS Najma Marcum PTA Physical Therapy Assistant        Mobility     Row Name 19 1126          Bed Mobility Assessment/Treatment    Comment (Bed Mobility)  not tested, patient sitting [facility]IC pre/post txsit    "-AS     [facility] Name 08/09/19 1126          Transfer Assessment/Treatment    Comment (Transfers)  sit to stand x2 attempts at bedside, verbal cues for to push up from recliner and to reach back when sitting  -AS     [facility] Name 08/09/19 1126          Sit-Stand Transfer    Sit-Stand Lyon (Transfers)  verbal cues;contact guard  -AS     Assistive Device (Sit-Stand Transfers)  walker, front-wheeled  -AS     [facility] Name 08/09/19 1126          Gait/Stairs Assessment/Training    Lyon Level (Gait)  verbal cues;contact guard  -AS     Assistive Device (Gait)  walker, front-wheeled  -AS     Distance in Feet (Gait)  10  -AS     Comment (Gait/Stairs)  pt. worked on 1 step forward and 1 step back x10 reps with B UE support on walker, patient with complaints of \" my knees feel like they are going to give way \". Distance limited by weakness and fatigue.  -AS       User Key  (r) = Recorded By, (t) = Taken By, (c) = Cosigned By    Initials Name Provider Type    AS Najma Marcum PTA Physical Therapy Assistant        Obj/Interventions     Row Name 08/09/19 1130          Therapeutic Exercise    Lower Extremity (Therapeutic Exercise)  marching while seated;LAQ (long arc quad), bilateral  -AS     Lower Extremity Range of Motion (Therapeutic Exercise)  hip flexion/extension, bilateral;hip abduction/adduction, bilateral;ankle dorsiflexion/plantar flexion, bilateral  -AS     Exercise Type (Therapeutic Exercise)  AROM (active range of motion)  -AS     Position (Therapeutic Exercise)  seated  -AS     Sets/Reps (Therapeutic Exercise)  1/10  -AS       User Key  (r) = Recorded By, (t) = Taken By, (c) = Cosigned By    Initials Name Provider Type    AS Najma Marcum PTA Physical Therapy Assistant        Goals/Plan    No documentation.       Clinical Impression     Row Name 08/09/19 1130          Pain Assessment    Additional Documentation  Pain Scale: Numbers Pre/Post-Treatment (Group)  -AS     Row Name 08/09/19 1130          " Pain Scale: Numbers Pre/Post-Treatment    Pain Scale: Numbers, Pretreatment  0/10 - no pain  -AS     Pain Scale: Numbers, Post-Treatment  0/10 - no pain  -AS     Row Name 08/09/19 1130 08/09/19 0806       Plan of Care Review    Plan of Care Reviewed With  patient  -AS  patient  -LC    Row Name 08/09/19 0658          Plan of Care Review    Plan of Care Reviewed With  patient  -LD       User Key  (r) = Recorded By, (t) = Taken By, (c) = Cosigned By    Initials Name Provider Type    AS Najma Marcum PTA Physical Therapy Assistant    Amanda Barrett RN Registered Nurse    Amanda Crowe RN Registered Nurse        Outcome Measures     Row Name 08/09/19 1131          How much help from another person do you currently need...    Turning from your back to your side while in flat bed without using bedrails?  3  -AS     Moving from lying on back to sitting on the side of a flat bed without bedrails?  3  -AS     Moving to and from a bed to a chair (including a wheelchair)?  2  -AS     Standing up from a chair using your arms (e.g., wheelchair, bedside chair)?  2  -AS     Climbing 3-5 steps with a railing?  2  -AS     To walk in hospital room?  2  -AS     AM-PAC 6 Clicks Score (PT)  14  -AS     Row Name 08/09/19 1131          Functional Assessment    Outcome Measure Options  AM-PAC 6 Clicks Basic Mobility (PT)  -AS       User Key  (r) = Recorded By, (t) = Taken By, (c) = Cosigned By    Initials Name Provider Type    AS Najma Marcum PTA Physical Therapy Assistant        Physical Therapy Education     Title: PT OT SLP Therapies (In Progress)     Topic: Physical Therapy (In Progress)     Point: Mobility training (In Progress)     Learning Progress Summary           Patient Acceptance, E, NR by AS at 8/9/2019 11:30 AM    Acceptance, E,TB, VU,NR by SABA at 8/9/2019  2:20 AM    Acceptance, E,D, NR by ROSA at 8/8/2019  9:15 AM                   Point: Home exercise program (In Progress)     Learning Progress Summary   "         Patient Acceptance, E, NR by AS at 8/9/2019 11:30 AM    Acceptance, E,TB, VU,NR by LD at 8/9/2019  2:20 AM    Acceptance, E,D, NR by BD at 8/8/2019  9:15 AM                   Point: Body mechanics (In Progress)     Learning Progress Summary           Patient Acceptance, E, NR by AS at 8/9/2019 11:30 AM    Acceptance, E,TB, VU,NR by LD at 8/9/2019  2:20 AM    Acceptance, E,D, NR by BD at 8/8/2019  9:15 AM                   Point: Precautions (In Progress)     Learning Progress Summary           Patient Acceptance, E, NR by AS at 8/9/2019 11:30 AM    Acceptance, E,TB, VU,NR by LD at 8/9/2019  2:20 AM    Acceptance, E,D, NR by BD at 8/8/2019  9:15 AM                               User Key     Initials Effective Dates Name Provider Type Discipline    AS 06/22/15 -  Najma Marcum PTA Physical Therapy Assistant PT    BD 06/08/18 -  Lashell Brunson, PT Physical Therapist PT    LD 07/24/19 -  Amanda Lynch RN Registered Nurse Nurse              PT Recommendation and Plan     Plan of Care Reviewed With: patient  Progress: improving  Outcome Summary: pt. worked on 1 step forward and 1 step back x10 reps with B UE support on walker, patient with complaints of \" my knees feel like they are going to give way \". Distance limited by weakness and fatigue.     Time Calculation:   PT Charges     Row Name 08/09/19 1131             Time Calculation    Start Time  1115  -AS      PT Received On  08/09/19  -AS      PT Goal Re-Cert Due Date  08/18/19  -AS        User Key  (r) = Recorded By, (t) = Taken By, (c) = Cosigned By    Initials Name Provider Type    AS Najma Marcum PTA Physical Therapy Assistant        Therapy Charges for Today     Code Description Service Date Service Provider Modifiers Qty    74019261174 HC GAIT TRAINING EA 15 MIN 8/9/2019 Najma Marcum, CATHERINE GP 1    25384812274 HC PT THER PROC EA 15 MIN 8/9/2019 Najma Marcum PTA GP 1          PT G-Codes  Outcome Measure Options: AM-PAC 6 " Clicks Basic Mobility (PT)  AM-PAC 6 Clicks Score (PT): 14  AM-PAC 6 Clicks Score (OT): 18  Modified Wells Scale: 1 - No significant disability despite symptoms.  Able to carry out all usual duties and activities.    Najma Marcum, PTA  8/9/2019

## 2019-08-09 NOTE — PLAN OF CARE
Problem: Patient Care Overview  Goal: Plan of Care Review  Outcome: Ongoing (interventions implemented as appropriate)   08/09/19 7091   Coping/Psychosocial   Plan of Care Reviewed With patient   Plan of Care Review   Progress no change   OTHER   Outcome Summary Alert and oriented. Forgetful. Respirations unlabored. NSR monitor. C/o low back and right leg pain which she stated is new onset. Tylenol prn once. Up to bathroom and recliner standby assist/walker. Daughter bedside intermittent.

## 2019-08-09 NOTE — PROGRESS NOTES
"Natasha Flores    Subjective     CC: stroke    History of Present Illness     Natasha Flores is followed with stroke. Her dizziness persists, but is improved. She denies new symptoms.       There have been no other changes in the patient's interval history since my consult note of 8/8/19.    I have reviewed and confirmed the past family, social and medical history as accurate on 8/9/19.     Review of Systems   Constitutional: Negative.    Respiratory: Negative.    Cardiovascular: Negative.    Gastrointestinal: Negative.    Genitourinary: Negative.        Objective     /76 (BP Location: Right arm, Patient Position: Lying)   Pulse 62   Temp 98 °F (36.7 °C) (Oral)   Resp 16   Ht 157.5 cm (62\")   Wt 63.5 kg (140 lb)   SpO2 97%   BMI 25.61 kg/m²     Physical Exam   Constitutional: She is oriented to person, place, and time.   Neurological: She is oriented to person, place, and time.   Psychiatric: Her speech is normal.        Neurologic Exam     Mental Status   Oriented to person, place, and time.   Attention: normal.   Speech: speech is normal   Level of consciousness: alert  Knowledge: good.   Normal comprehension.     Cranial Nerves   Cranial nerves II through XII intact.     Motor Exam   Muscle bulk: normal  Overall muscle tone: normalGrips OK bilaterally       Laboratory and radiological testing: ECHO-no cardio-embolic source; CTA's-right vertebral occlusion and 70-80% left ICA stenosis    Assessment/Plan       Natasha Flores is followed with stroke. I think her current treatment is reasonable (ASA/Plavix/statin). Her carotid stenosis is asymptomatic, but after discussion she would like a surgical consultation to consider the merits of CEA or CHELSI further.       As part of this visit I reviewed radiology results and reviewed records from the current hospitalization which is incorporated in the HPI.  "

## 2019-08-09 NOTE — CONSULTS
Inpatient Neurosurgery Consult  Consult performed by: Alana De Oliveira PA-C  Consult ordered by: Otf Jane MD          Referring Provider: Otf Jane MD    Patient Care Team:  Jeffrey Ca MD as PCP - General (Family Medicine)  Provider, No Known as PCP - Family Medicine    Chief Complaint: Carotid stenosis    Subjective .     History of present illness:       This is a 69-year-old woman with a past medical history significant for HTN, PVD, alcohol abuse, tobacco abuse (patient states she quit smoking 5 weeks ago), and most recently bilateral femoral stent placement 1 month ago.  Patient presented to [facility] ED on 2019 after having a sudden onset of dizziness and posterior headache followed by nausea and vomiting.  CTA performed in the emergency department demonstrated severe atherosclerotic disease, vertebral artery occlusion and right cerebellar stroke.  The patient was admitted for higher level of care.  She was started on Plavix 1 month ago and states she has been compliant with his medication.  Patient's symptoms have improved during her hospital stay.  She is ambulatory and states she did have a couple episodes of dizziness/balance disturbances while ambulating.  She denies any other complaints.  She denies nausea, vomiting, headache, weakness, numbness, vision changes, slurred speech or facial droop.      Review of Systems   Constitutional: Positive for activity change.   Neurological: Positive for dizziness. Negative for tremors, seizures, syncope, facial asymmetry, speech difficulty, weakness, numbness and headaches.   Psychiatric/Behavioral: Positive for agitation.   All other systems reviewed and are negative.      History  Past Medical History:   Diagnosis Date   • Anxiety    • Anxiety    • Depression    • Hypertension    • PVD (peripheral vascular disease) (CMS/[facility])    ,   Past Surgical History:   Procedure Laterality Date   •  SECTION     • LEG SURGERY      STENT PLACEMENT     ,    Family History   Problem Relation Age of Onset   • Stroke Maternal Grandmother    ,   Social History     Tobacco Use   • Smoking status: Former Smoker     Packs/day: 1.00     Types: Cigarettes   • Smokeless tobacco: Never Used   • Tobacco comment: PT QUIT 1 MONTH AGO    Substance Use Topics   • Alcohol use: No     Comment: PT REPORTS SHE STOPPED DRINKING 2 MONTHS AGO    • Drug use: No   ,   Medications Prior to Admission   Medication Sig Dispense Refill Last Dose   • amLODIPine (NORVASC) 5 MG tablet Take 5 mg by mouth Daily.      • clopidogrel (PLAVIX) 75 MG tablet Take 75 mg by mouth Daily.      • FLUoxetine (PROzac) 10 MG capsule Take 20 mg by mouth Daily.      • folic acid (FOLVITE) 1 MG tablet Take 1 mg by mouth Daily.      • losartan (COZAAR) 50 MG tablet Take 100 mg by mouth Daily.      • Thiamine HCl (THIAMINE PO) Take 100 mg by mouth. PT unsure of dose       , Scheduled Meds:    amLODIPine 5 mg Oral Daily   aspirin 325 mg Oral Daily   Or      aspirin 300 mg Rectal Daily   atorvastatin 80 mg Oral Nightly   clopidogrel 75 mg Oral Daily   FLUoxetine 20 mg Oral Daily   folic acid 1 mg Oral Daily   heparin (porcine) 5,000 Units Subcutaneous Q8H   losartan 100 mg Oral Daily   sodium chloride 3 mL Intravenous Q12H   thiamine 100 mg Oral Daily   , Continuous Infusions:   , PRN Meds:  •  acetaminophen  •  melatonin  •  ondansetron  •  sodium chloride  •  [COMPLETED] Insert peripheral IV **AND** sodium chloride  •  sodium chloride and Allergies:  Penicillins   SMOKING STATUS: Current smoker, states she quit smoking 5 weeks ago  Objective     Vital Signs   Temp:  [97.8 °F (36.6 °C)-98.2 °F (36.8 °C)] 98.2 °F (36.8 °C)  Heart Rate:  [59-86] 68  Resp:  [16-18] 16  BP: (108-140)/(56-78) 118/67  Body mass index is 25.61 kg/m².    Physical Exam:  NEUROLOGICAL:  - A&Ox3. Patient is conversant, answers questions appropriately and speech is intact without dysarthria.   - Attention span, language function, and cognition are  intact.  - Strength is intact in the upper and lower extremities to direct testing.  - Muscle tone is normal throughout.  - Station and gait are not examined. Reported ataxic   - Sensation is intact to light touch testing throughout.  - Deep tendon reflexes are 1+ and symmetrical.    - No clonus is elicited.  - Coordination is intact. Patient is slow to perform finger to nose but does so without dyskinesia. Heel-manuel performed without difficulty.   - No pronator drift.   CRANIAL NERVES:  Cranial Nerve II: Visual fields are full to confrontation.  Cranial Nerve III, IV, and VI: PERRLADC. Extraocular movements are intact.   Cranial Nerve V: Facial sensation is intact to light touch.  Cranial Nerve VII: Muscles of facial expression demonstate no weakness or asymmetry.  Cranial Nerve VIII: Hearing is intact to finger rub bilaterally.  Cranial Nerve IX and X: Palate elevates symmetrically.  Cranial Nerve XI: Shoulder shrug is intact bilaterally.  Cranial Nerve XII: Tongue is midline without evidence of atrophy or fasciculation      Results Review:   I reviewed the patient's new imaging results and agree with the interpretation.  Discussed with Dr. Restrepo       Assessment/Plan       Cerebellar stroke (CMS/HCC)    Generalized anxiety disorder    Depression    PAD (peripheral artery disease) (CMS/[facility])    Essential hypertension    History of alcohol dependence (CMS/[facility])    History of tobacco abuse    Leukocytosis    Memory loss      Bilateral carotid artery stenosis   Continue Aspirin, Plavix and statin   Counseled patient on tobacco cessation   Follow up as outpatient with Dr. Restrepo with carotid duplex in 1 month     I discussed the patients findings and my recommendations with patient    Alana De Oliveira PA-C  08/09/19  1:01 PM

## 2019-08-09 NOTE — PLAN OF CARE
"Problem: Patient Care Overview  Goal: Plan of Care Review  Outcome: Ongoing (interventions implemented as appropriate)   08/09/19 1130   Coping/Psychosocial   Plan of Care Reviewed With patient   Plan of Care Review   Progress improving   OTHER   Outcome Summary pt. worked on 1 step forward and 1 step back x10 reps with B UE support on walker, patient with complaints of \" my knees feel like they are going to give way \". Distance limited by weakness and fatigue.         "

## 2019-08-10 PROCEDURE — 25010000002 HEPARIN (PORCINE) PER 1000 UNITS: Performed by: INTERNAL MEDICINE

## 2019-08-10 PROCEDURE — 99232 SBSQ HOSP IP/OBS MODERATE 35: CPT | Performed by: INTERNAL MEDICINE

## 2019-08-10 RX ORDER — HYDROCODONE BITARTRATE AND ACETAMINOPHEN 5; 325 MG/1; MG/1
1 TABLET ORAL ONCE AS NEEDED
Status: COMPLETED | OUTPATIENT
Start: 2019-08-10 | End: 2019-08-10

## 2019-08-10 RX ORDER — FAMOTIDINE 20 MG/1
20 TABLET, FILM COATED ORAL 2 TIMES DAILY PRN
Status: DISCONTINUED | OUTPATIENT
Start: 2019-08-10 | End: 2019-08-12 | Stop reason: HOSPADM

## 2019-08-10 RX ORDER — HYDROCODONE BITARTRATE AND ACETAMINOPHEN 5; 325 MG/1; MG/1
1 TABLET ORAL EVERY 6 HOURS PRN
Status: DISCONTINUED | OUTPATIENT
Start: 2019-08-10 | End: 2019-08-10

## 2019-08-10 RX ADMIN — CLOPIDOGREL BISULFATE 75 MG: 75 TABLET ORAL at 10:12

## 2019-08-10 RX ADMIN — HEPARIN SODIUM 5000 UNITS: 5000 INJECTION INTRAVENOUS; SUBCUTANEOUS at 16:49

## 2019-08-10 RX ADMIN — SODIUM CHLORIDE, PRESERVATIVE FREE 3 ML: 5 INJECTION INTRAVENOUS at 23:24

## 2019-08-10 RX ADMIN — ACETAMINOPHEN 650 MG: 325 TABLET ORAL at 23:01

## 2019-08-10 RX ADMIN — MELATONIN TAB 5 MG 5 MG: 5 TAB at 23:01

## 2019-08-10 RX ADMIN — HEPARIN SODIUM 5000 UNITS: 5000 INJECTION INTRAVENOUS; SUBCUTANEOUS at 23:24

## 2019-08-10 RX ADMIN — ATORVASTATIN CALCIUM 80 MG: 40 TABLET, FILM COATED ORAL at 23:02

## 2019-08-10 RX ADMIN — LOSARTAN POTASSIUM 100 MG: 50 TABLET ORAL at 10:11

## 2019-08-10 RX ADMIN — ACETAMINOPHEN 650 MG: 325 TABLET ORAL at 00:16

## 2019-08-10 RX ADMIN — HEPARIN SODIUM 5000 UNITS: 5000 INJECTION INTRAVENOUS; SUBCUTANEOUS at 05:57

## 2019-08-10 RX ADMIN — ASPIRIN 325 MG ORAL TABLET 325 MG: 325 PILL ORAL at 10:11

## 2019-08-10 RX ADMIN — AMLODIPINE BESYLATE 5 MG: 5 TABLET ORAL at 10:10

## 2019-08-10 RX ADMIN — FOLIC ACID 1 MG: 1 TABLET ORAL at 10:11

## 2019-08-10 RX ADMIN — SODIUM CHLORIDE, PRESERVATIVE FREE 3 ML: 5 INJECTION INTRAVENOUS at 10:12

## 2019-08-10 RX ADMIN — ONDANSETRON HYDROCHLORIDE 8 MG: 4 TABLET, FILM COATED ORAL at 12:43

## 2019-08-10 RX ADMIN — Medication 100 MG: at 10:11

## 2019-08-10 RX ADMIN — HYDROCODONE BITARTRATE AND ACETAMINOPHEN 1 TABLET: 5; 325 TABLET ORAL at 02:38

## 2019-08-10 RX ADMIN — HEPARIN SODIUM 5000 UNITS: 5000 INJECTION INTRAVENOUS; SUBCUTANEOUS at 00:20

## 2019-08-10 RX ADMIN — FLUOXETINE HYDROCHLORIDE 20 MG: 20 CAPSULE ORAL at 12:44

## 2019-08-10 NOTE — PROGRESS NOTES
[facility] Medicine Services  PROGRESS NOTE    Patient Name: Natasha Flores  : 1950  MRN: [id number]    Date of Admission: 2019  Length of Stay: 3  Primary Care Physician: Jeffrey Ca MD    Subjective   Subjective     CC:  F/u stroke    HPI:  Some upper abdominal discomfort across belly that radiated into her back last night.  No vomiting.  Doesn't like the food here.    Review of Systems  Gen- No fevers, chills  CV- No chest pain, palpitations  Resp- No cough, dyspnea  GI- As above    Otherwise ROS is negative except as mentioned in the HPI.    Objective   Objective     Vital Signs:   Temp:  [97.3 °F (36.3 °C)-98.2 °F (36.8 °C)] 97.8 °F (36.6 °C)  Heart Rate:  [65-91] 78  Resp:  [14-16] 16  BP: (106-137)/(54-79) 126/79  Total (NIH Stroke Scale): 0     Physical Exam:  Constitutional: No acute distress, awake, alert, laying in bed  HENT: NCAT, mucous membranes moist  Respiratory: Clear to auscultation bilaterally, respiratory effort normal   Cardiovascular: RRR, no murmurs, rubs, or gallops   Gastrointestinal: Positive bowel sounds, soft, nontender, nondistended  Musculoskeletal: No bilateral ankle edema  Psychiatric: Appropriate affect, cooperative  Neurologic: Speech normal, cranial nerves grossly intact  Skin: No rashes    Results Reviewed:  I have personally reviewed current lab, radiology, and data and agree.    Results from last 7 days   Lab Units 19  0753 19  1833   WBC 10*3/mm3 10.68 15.73*   HEMOGLOBIN g/dL 12.3 13.4   HEMATOCRIT % 37.5 41.3   PLATELETS 10*3/mm3 290 338   INR   --  0.98     Results from last 7 days   Lab Units 19  0753 19  1833   SODIUM mmol/L 140 139   POTASSIUM mmol/L 4.0 4.4   CHLORIDE mmol/L 103 104   CO2 mmol/L 24.0 22.0   BUN mg/dL 18 22   CREATININE mg/dL 0.71 0.85   GLUCOSE mg/dL 97 129*   CALCIUM mg/dL 10.1 9.9   ALT (SGPT) U/L 8 9   AST (SGOT) U/L 14 17     Estimated Creatinine Clearance: 56.5 mL/min (by C-G formula based  on SCr of 0.71 mg/dL).    Microbiology Results Abnormal     None          Imaging Results (last 24 hours)     ** No results found for the last 24 hours. **          Results for orders placed during the hospital encounter of 08/07/19   Adult Transthoracic Echo Complete W/ Cont if Necessary Per Protocol (With Agitated Saline)    Narrative · Estimated EF = 75%.  · The cardiac valves are anatomically and functionally normal.  · Saline test results are negative.          I have reviewed the medications:  Scheduled Meds:    amLODIPine 5 mg Oral Daily   aspirin 325 mg Oral Daily   Or      aspirin 300 mg Rectal Daily   atorvastatin 80 mg Oral Nightly   clopidogrel 75 mg Oral Daily   FLUoxetine 20 mg Oral Daily   folic acid 1 mg Oral Daily   heparin (porcine) 5,000 Units Subcutaneous Q8H   losartan 100 mg Oral Daily   sodium chloride 3 mL Intravenous Q12H   thiamine 100 mg Oral Daily     Continuous Infusions:   PRN Meds:.•  acetaminophen  •  famotidine  •  melatonin  •  ondansetron  •  sodium chloride  •  [COMPLETED] Insert peripheral IV **AND** sodium chloride  •  sodium chloride      Assessment/Plan   Assessment / Plan     Active Hospital Problems    Diagnosis  POA   • **Cerebellar stroke (CMS/HCC) [I63.9]  Yes   • Memory loss [R41.3]  Unknown   • Generalized anxiety disorder [F41.1]  Yes   • Depression [F32.9]  Yes   • PAD (peripheral artery disease) (CMS/HCC) [I73.9]  Yes   • Essential hypertension [I10]  Yes   • History of alcohol dependence (CMS/HCC) [F10.21]  Yes   • History of tobacco abuse [Z87.891]  Not Applicable   • Leukocytosis [D72.829]  Unknown      Resolved Hospital Problems   No resolved problems to display.        Brief Hospital Course to date:  Natasha Flores is a 69 y.o. female with hx of PAD, HTN, depression present for acute onset of dizziness and N/V.  Imaging shows acute cerebellar stroke.    - Evaluated by neurology.    - Continue ASA and plavix, statin  - echo shows normal LVEF, negative bubble  study  - Follow up with Dr. Restrepo in 1 month for bilateral carotid stenosis with duplex  - has PAD and scheduled for LLE stenting soon with [facility] doc.  Currently asymptomatic  - continue PT/OT, probably needs inpatient rehab based on progress  - Add pepcid PRN indigestion/heartburn    DVT Prophylaxis:  heparin    Disposition: I expect the patient to be discharged to rehab when a bed is available    CODE STATUS:   Code Status and Medical Interventions:   Ordered at: 08/07/19 8429     Level Of Support Discussed With:    Patient     Code Status:    CPR     Medical Interventions (Level of Support Prior to Arrest):    Full       Electronically signed by Lottie Aleman MD, 08/10/19, 12:13 PM.

## 2019-08-10 NOTE — NURSING NOTE
Pt complains of intense sharp back pain to middle of back 6/10 call placed to nurse practitioner orders received for norco 5 one time dose

## 2019-08-11 LAB
ANION GAP SERPL CALCULATED.3IONS-SCNC: 13 MMOL/L (ref 5–15)
BUN BLD-MCNC: 15 MG/DL (ref 8–23)
BUN/CREAT SERPL: 16.7 (ref 7–25)
CALCIUM SPEC-SCNC: 9.2 MG/DL (ref 8.6–10.5)
CHLORIDE SERPL-SCNC: 101 MMOL/L (ref 98–107)
CO2 SERPL-SCNC: 25 MMOL/L (ref 22–29)
CREAT BLD-MCNC: 0.9 MG/DL (ref 0.57–1)
DEPRECATED RDW RBC AUTO: 44.8 FL (ref 37–54)
ERYTHROCYTE [DISTWIDTH] IN BLOOD BY AUTOMATED COUNT: 12.8 % (ref 12.3–15.4)
GFR SERPL CREATININE-BSD FRML MDRD: 62 ML/MIN/1.73
GLUCOSE BLD-MCNC: 93 MG/DL (ref 65–99)
HCT VFR BLD AUTO: 37 % (ref 34–46.6)
HGB BLD-MCNC: 12.1 G/DL (ref 12–15.9)
MCH RBC QN AUTO: 31.6 PG (ref 26.6–33)
MCHC RBC AUTO-ENTMCNC: 32.7 G/DL (ref 31.5–35.7)
MCV RBC AUTO: 96.6 FL (ref 79–97)
PLATELET # BLD AUTO: 288 10*3/MM3 (ref 140–450)
PMV BLD AUTO: 9.7 FL (ref 6–12)
POTASSIUM BLD-SCNC: 4.1 MMOL/L (ref 3.5–5.2)
RBC # BLD AUTO: 3.83 10*6/MM3 (ref 3.77–5.28)
SODIUM BLD-SCNC: 139 MMOL/L (ref 136–145)
VIT B1 BLD-SCNC: 203.2 NMOL/L (ref 66.5–200)
WBC NRBC COR # BLD: 10.41 10*3/MM3 (ref 3.4–10.8)

## 2019-08-11 PROCEDURE — 80048 BASIC METABOLIC PNL TOTAL CA: CPT | Performed by: INTERNAL MEDICINE

## 2019-08-11 PROCEDURE — 25010000002 HEPARIN (PORCINE) PER 1000 UNITS: Performed by: INTERNAL MEDICINE

## 2019-08-11 PROCEDURE — 85027 COMPLETE CBC AUTOMATED: CPT | Performed by: INTERNAL MEDICINE

## 2019-08-11 PROCEDURE — 99232 SBSQ HOSP IP/OBS MODERATE 35: CPT | Performed by: INTERNAL MEDICINE

## 2019-08-11 RX ADMIN — HEPARIN SODIUM 5000 UNITS: 5000 INJECTION INTRAVENOUS; SUBCUTANEOUS at 17:25

## 2019-08-11 RX ADMIN — HEPARIN SODIUM 5000 UNITS: 5000 INJECTION INTRAVENOUS; SUBCUTANEOUS at 22:42

## 2019-08-11 RX ADMIN — MELATONIN TAB 5 MG 5 MG: 5 TAB at 22:44

## 2019-08-11 RX ADMIN — Medication 100 MG: at 08:34

## 2019-08-11 RX ADMIN — ASPIRIN 325 MG ORAL TABLET 325 MG: 325 PILL ORAL at 08:33

## 2019-08-11 RX ADMIN — HEPARIN SODIUM 5000 UNITS: 5000 INJECTION INTRAVENOUS; SUBCUTANEOUS at 06:14

## 2019-08-11 RX ADMIN — FOLIC ACID 1 MG: 1 TABLET ORAL at 08:34

## 2019-08-11 RX ADMIN — SODIUM CHLORIDE, PRESERVATIVE FREE 3 ML: 5 INJECTION INTRAVENOUS at 08:34

## 2019-08-11 RX ADMIN — FLUOXETINE HYDROCHLORIDE 20 MG: 20 CAPSULE ORAL at 08:33

## 2019-08-11 RX ADMIN — SODIUM CHLORIDE, PRESERVATIVE FREE 3 ML: 5 INJECTION INTRAVENOUS at 22:43

## 2019-08-11 RX ADMIN — AMLODIPINE BESYLATE 5 MG: 5 TABLET ORAL at 08:34

## 2019-08-11 RX ADMIN — CLOPIDOGREL BISULFATE 75 MG: 75 TABLET ORAL at 08:34

## 2019-08-11 RX ADMIN — ACETAMINOPHEN 650 MG: 325 TABLET ORAL at 08:32

## 2019-08-11 RX ADMIN — ATORVASTATIN CALCIUM 80 MG: 40 TABLET, FILM COATED ORAL at 22:42

## 2019-08-11 RX ADMIN — LOSARTAN POTASSIUM 100 MG: 50 TABLET ORAL at 08:34

## 2019-08-11 NOTE — NURSING NOTE
Spoke with family at bedside r/t request to speak with  r/t placement. Called charge Jessica and requested to contact case management. Per Jessica case management gone for the day and will be contacted in the am. Note left in chart. Family verbalized understanding.

## 2019-08-11 NOTE — PROGRESS NOTES
[facility] Medicine Services  PROGRESS NOTE    Patient Name: Natasha Flores  : 1950  MRN: [id number]    Date of Admission: 2019  Length of Stay: 4  Primary Care Physician: Jeffrey Ca MD    Subjective   Subjective     CC:  F/u stroke    HPI:  She is still getting dizzy with ambulation.    Review of Systems  Gen- No fevers, chills  CV- No chest pain, palpitations  Resp- No cough, dyspnea  GI- As above    Otherwise ROS is negative except as mentioned in the HPI.    Objective   Objective     Vital Signs:   Temp:  [97.7 °F (36.5 °C)-98.5 °F (36.9 °C)] 98 °F (36.7 °C)  Heart Rate:  [72-88] 72  Resp:  [16-18] 16  BP: (107-143)/(63-76) 127/64  Total (NIH Stroke Scale): 0     Physical Exam:  Constitutional: No acute distress, sleeping but awakens easily, laying in bed  HENT: NCAT, mucous membranes moist  Respiratory: Clear to auscultation bilaterally, respiratory effort normal   Cardiovascular: RRR, no murmurs, rubs, or gallops   Gastrointestinal: Positive bowel sounds, soft, nontender, nondistended  Musculoskeletal: No bilateral ankle edema  Psychiatric: Appropriate affect, cooperative  Neurologic: Speech normal, cranial nerves grossly intact  Skin: No rashes    Results Reviewed:  I have personally reviewed current lab, radiology, and data and agree.    Results from last 7 days   Lab Units 19  0619  0753 19  1833   WBC 10*3/mm3 10.41 10.68 15.73*   HEMOGLOBIN g/dL 12.1 12.3 13.4   HEMATOCRIT % 37.0 37.5 41.3   PLATELETS 10*3/mm3 288 290 338   INR   --   --  0.98     Results from last 7 days   Lab Units 19  0617 19  0753 19  1833   SODIUM mmol/L 139 140 139   POTASSIUM mmol/L 4.1 4.0 4.4   CHLORIDE mmol/L 101 103 104   CO2 mmol/L 25.0 24.0 22.0   BUN mg/dL 15 18 22   CREATININE mg/dL 0.90 0.71 0.85   GLUCOSE mg/dL 93 97 129*   CALCIUM mg/dL 9.2 10.1 9.9   ALT (SGPT) U/L  --  8 9   AST (SGOT) U/L  --  14 17     Estimated Creatinine Clearance: 50.2  mL/min (by C-G formula based on SCr of 0.9 mg/dL).    Microbiology Results Abnormal     None          Imaging Results (last 24 hours)     ** No results found for the last 24 hours. **          Results for orders placed during the hospital encounter of 08/07/19   Adult Transthoracic Echo Complete W/ Cont if Necessary Per Protocol (With Agitated Saline)    Narrative · Estimated EF = 75%.  · The cardiac valves are anatomically and functionally normal.  · Saline test results are negative.          I have reviewed the medications:  Scheduled Meds:    amLODIPine 5 mg Oral Daily   aspirin 325 mg Oral Daily   Or      aspirin 300 mg Rectal Daily   atorvastatin 80 mg Oral Nightly   clopidogrel 75 mg Oral Daily   FLUoxetine 20 mg Oral Daily   folic acid 1 mg Oral Daily   heparin (porcine) 5,000 Units Subcutaneous Q8H   losartan 100 mg Oral Daily   sodium chloride 3 mL Intravenous Q12H   thiamine 100 mg Oral Daily     Continuous Infusions:   PRN Meds:.•  acetaminophen  •  famotidine  •  melatonin  •  ondansetron  •  sodium chloride  •  [COMPLETED] Insert peripheral IV **AND** sodium chloride  •  sodium chloride      Assessment/Plan   Assessment / Plan     Active Hospital Problems    Diagnosis  POA   • **Cerebellar stroke (CMS/HCC) [I63.9]  Yes   • Memory loss [R41.3]  Unknown   • Generalized anxiety disorder [F41.1]  Yes   • Depression [F32.9]  Yes   • PAD (peripheral artery disease) (CMS/HCC) [I73.9]  Yes   • Essential hypertension [I10]  Yes   • History of alcohol dependence (CMS/HCC) [F10.21]  Yes   • History of tobacco abuse [Z87.891]  Not Applicable   • Leukocytosis [D72.829]  Unknown      Resolved Hospital Problems   No resolved problems to display.        Brief Hospital Course to date:  Natasha Flores is a 69 y.o. female with hx of PAD, HTN, depression present for acute onset of dizziness and N/V.  Imaging shows acute cerebellar stroke.    - Evaluated by neurology.    - Continue ASA and plavix, statin  - echo shows normal  LVEF, negative bubble study  - Follow up with Dr. Restrepo in 1 month for bilateral carotid stenosis with duplex  - has PAD and scheduled for LLE stenting soon with [facility] doc.  Currently asymptomatic  - continue PT/OT, probably needs inpatient rehab based on progress  - Added pepcid PRN indigestion/heartburn    DVT Prophylaxis:  heparin    Disposition: I expect the patient to be discharged to rehab when a bed is available    CODE STATUS:   Code Status and Medical Interventions:   Ordered at: 08/07/19 5107     Level Of Support Discussed With:    Patient     Code Status:    CPR     Medical Interventions (Level of Support Prior to Arrest):    Full       Electronically signed by Lottie Aleman MD, 08/11/19, 12:08 PM.

## 2019-08-12 ENCOUNTER — TELEPHONE (OUTPATIENT)
Dept: NEUROSURGERY | Facility: CLINIC | Age: 69
End: 2019-08-12

## 2019-08-12 VITALS
TEMPERATURE: 98.5 F | RESPIRATION RATE: 18 BRPM | HEART RATE: 75 BPM | SYSTOLIC BLOOD PRESSURE: 107 MMHG | HEIGHT: 62 IN | WEIGHT: 139.99 LBS | OXYGEN SATURATION: 97 % | DIASTOLIC BLOOD PRESSURE: 74 MMHG | BODY MASS INDEX: 25.76 KG/M2

## 2019-08-12 DIAGNOSIS — I65.23 BILATERAL CAROTID ARTERY STENOSIS: Primary | ICD-10-CM

## 2019-08-12 PROBLEM — D72.829 LEUKOCYTOSIS: Status: RESOLVED | Noted: 2019-08-07 | Resolved: 2019-08-12

## 2019-08-12 PROCEDURE — 99231 SBSQ HOSP IP/OBS SF/LOW 25: CPT | Performed by: PSYCHIATRY & NEUROLOGY

## 2019-08-12 PROCEDURE — 25010000002 HEPARIN (PORCINE) PER 1000 UNITS: Performed by: INTERNAL MEDICINE

## 2019-08-12 PROCEDURE — 99239 HOSP IP/OBS DSCHRG MGMT >30: CPT | Performed by: INTERNAL MEDICINE

## 2019-08-12 PROCEDURE — 97116 GAIT TRAINING THERAPY: CPT

## 2019-08-12 PROCEDURE — 97110 THERAPEUTIC EXERCISES: CPT

## 2019-08-12 RX ORDER — SENNA AND DOCUSATE SODIUM 50; 8.6 MG/1; MG/1
2 TABLET, FILM COATED ORAL NIGHTLY
Status: DISCONTINUED | OUTPATIENT
Start: 2019-08-12 | End: 2019-08-12 | Stop reason: HOSPADM

## 2019-08-12 RX ORDER — ASPIRIN 325 MG
325 TABLET ORAL DAILY
Start: 2019-08-13 | End: 2019-09-23

## 2019-08-12 RX ORDER — ATORVASTATIN CALCIUM 80 MG/1
80 TABLET, FILM COATED ORAL NIGHTLY
Start: 2019-08-12 | End: 2021-01-01 | Stop reason: SDUPTHER

## 2019-08-12 RX ORDER — ACETAMINOPHEN 325 MG/1
650 TABLET ORAL EVERY 6 HOURS PRN
Start: 2019-08-12

## 2019-08-12 RX ORDER — SENNA AND DOCUSATE SODIUM 50; 8.6 MG/1; MG/1
2 TABLET, FILM COATED ORAL NIGHTLY PRN
Start: 2019-08-12 | End: 2020-09-02

## 2019-08-12 RX ORDER — BISACODYL 10 MG
10 SUPPOSITORY, RECTAL RECTAL DAILY PRN
Status: DISCONTINUED | OUTPATIENT
Start: 2019-08-12 | End: 2019-08-12 | Stop reason: HOSPADM

## 2019-08-12 RX ORDER — CHOLECALCIFEROL (VITAMIN D3) 125 MCG
5 CAPSULE ORAL NIGHTLY PRN
Start: 2019-08-12

## 2019-08-12 RX ORDER — BISACODYL 10 MG
10 SUPPOSITORY, RECTAL RECTAL DAILY PRN
Start: 2019-08-12 | End: 2020-09-02

## 2019-08-12 RX ADMIN — LOSARTAN POTASSIUM 100 MG: 50 TABLET ORAL at 12:30

## 2019-08-12 RX ADMIN — FOLIC ACID 1 MG: 1 TABLET ORAL at 10:00

## 2019-08-12 RX ADMIN — Medication 100 MG: at 10:00

## 2019-08-12 RX ADMIN — HEPARIN SODIUM 5000 UNITS: 5000 INJECTION INTRAVENOUS; SUBCUTANEOUS at 06:33

## 2019-08-12 RX ADMIN — CLOPIDOGREL BISULFATE 75 MG: 75 TABLET ORAL at 10:00

## 2019-08-12 RX ADMIN — FLUOXETINE HYDROCHLORIDE 20 MG: 20 CAPSULE ORAL at 10:00

## 2019-08-12 RX ADMIN — ASPIRIN 325 MG ORAL TABLET 325 MG: 325 PILL ORAL at 10:00

## 2019-08-12 RX ADMIN — AMLODIPINE BESYLATE 5 MG: 5 TABLET ORAL at 10:00

## 2019-08-12 RX ADMIN — SODIUM CHLORIDE, PRESERVATIVE FREE 3 ML: 5 INJECTION INTRAVENOUS at 10:01

## 2019-08-12 NOTE — PROGRESS NOTES
Case Management Discharge Note    Final Note: Ms. Flores has a skilled bed at [facility] today, if medically ready.  Notified Dr. Aleman.  Notified the patient at the bedside and also called her daughter, Umesh, by phone.  Both patient and Umesh are agreeable to transfer to [facility].  [facility] is scheduled to transport Ms. Flores to the facility at 3:30pm today.  Please bring Ms. Flores to the [address] by  time.  Please call report to [facility] at [phone].  Have a copy of the DC summary, AVS and any hard scripts in the DC packet.  Thank you.    Destination - Selection Complete      Service Provider Request Status Selected Services Address Phone Number Fax Number    [facility] Selected Skilled Nursing [address] [id number] [phone]      Durable Medical Equipment      No service has been selected for the patient.      Dialysis/Infusion      No service has been selected for the patient.      Home Medical Care      No service has been selected for the patient.      Therapy      No service has been selected for the patient.      Community Resources      No service has been selected for the patient.        Transportation Services  Ambulance: [facility]    Final Discharge Disposition Code: 03 - skilled nursing facility (SNF)

## 2019-08-12 NOTE — PROGRESS NOTES
Continued Stay Note  [facility]     Patient Name: Natasha Flores  MRN: [id number]  Today's Date: 8/12/2019    Admit Date: 8/7/2019    Discharge Plan     Row Name 08/12/19 0817       Plan    Plan  [facility] or [facility]    Patient/Family in Agreement with Plan  yes    Plan Comments  Met with Ms. Flores and her daughter, Adrianna, last evening, for discharge planning.  OT recommendation is for IPR.  Discussed rehab facilities with patient and family and they requested referrals to [facility] and [facility].  Referred Ms. Flores to both facilities this morning.  The facility reps will let CM know if beds available.   CM will follow up with daughter, Silvia, after rehab bed is confirmed, and will plan on transfer, if medically ready.    Final Discharge Disposition Code  03 - skilled nursing facility (SNF)        Discharge Codes    No documentation.       Expected Discharge Date and Time     Expected Discharge Date Expected Discharge Time    Aug 13, 2019             Janell Meléndez

## 2019-08-12 NOTE — PLAN OF CARE
Problem: Patient Care Overview  Goal: Plan of Care Review  Outcome: Ongoing (interventions implemented as appropriate)   08/12/19 6277   Coping/Psychosocial   Plan of Care Reviewed With patient   Plan of Care Review   Progress improving   OTHER   Outcome Summary Pt. good progress with ADL task completion. Working on improving balance. Pt. needs support of sink or other with any task requiring reaching up or down or one foot stance.

## 2019-08-12 NOTE — THERAPY TREATMENT NOTE
Patient Name: Natasha Flores  : 1950    MRN: [id number]                              Today's Date: 2019       Admit Date: 2019    Visit Dx:     ICD-10-CM ICD-9-CM   1. Cerebellar stroke (CMS/HCC) I63.9 434.91   2. Atherosclerotic occlusive disease I70.90 440.9   3. Essential hypertension I10 401.9   4. Impaired mobility and ADLs Z74.09 799.89     Patient Active Problem List   Diagnosis   • Cerebellar stroke (CMS/HCC)   • Generalized anxiety disorder   • Depression   • PAD (peripheral artery disease) (CMS/[facility])   • Essential hypertension   • History of alcohol dependence (CMS/[facility])   • History of tobacco abuse   • Leukocytosis   • Memory loss     Past Medical History:   Diagnosis Date   • Anxiety    • Anxiety    • Depression    • Hypertension    • PVD (peripheral vascular disease) (CMS/[facility])      Past Surgical History:   Procedure Laterality Date   •  SECTION     • LEG SURGERY      STENT PLACEMENT       General Information     Row Name 19          PT Evaluation Time/Intention    Document Type  therapy note (daily note)  -AS     Mode of Treatment  physical therapy  -AS     Row Name 19          General Information    Patient Profile Reviewed?  yes  -AS     Existing Precautions/Restrictions  fall  -AS     Row Name 19          Cognitive Assessment/Intervention- PT/OT    Orientation Status (Cognition)  oriented x 4  -AS     Personal Safety Interventions  fall prevention program maintained;gait belt;nonskid shoes/slippers when out of bed  -AS     Row Name 19          Safety Issues, Functional Mobility    Safety Issues Affecting Function (Mobility)  safety precaution awareness  -AS     Impairments Affecting Function (Mobility)  balance;strength  -AS       User Key  (r) = Recorded By, (t) = Taken By, (c) = Cosigned By    Initials Name Provider Type    AS Najma Marcum PTA Physical Therapy Assistant        Mobility     Row Name 19          Bed  Mobility Assessment/Treatment    Supine-Sit Luna (Bed Mobility)  supervision  -AS     Assistive Device (Bed Mobility)  bed rails;head of bed elevated  -AS     Row Name 08/12/19 0830          Transfer Assessment/Treatment    Comment (Transfers)  verbal cues to push up from bed and not to pull up on walker  -AS     Row Name 08/12/19 0830          Sit-Stand Transfer    Sit-Stand Luna (Transfers)  verbal cues;contact guard  -AS     Assistive Device (Sit-Stand Transfers)  walker, front-wheeled  -AS     Row Name 08/12/19 0830          Gait/Stairs Assessment/Training    Luna Level (Gait)  verbal cues;contact guard  -AS     Assistive Device (Gait)  walker, front-wheeled  -AS     Distance in Feet (Gait)  90  -AS     Deviations/Abnormal Patterns (Gait)  lynn decreased;gait speed decreased  -AS     Comment (Gait/Stairs)  patient ambulated 90 feet with rolling walker for support, verbal cues to stay inside walker and to increase step length. DIstance limited by weakness and fatigue.  -AS       User Key  (r) = Recorded By, (t) = Taken By, (c) = Cosigned By    Initials Name Provider Type    AS Najma Marcum PTA Physical Therapy Assistant        Obj/Interventions     Row Name 08/12/19 0837          Therapeutic Exercise    Lower Extremity (Therapeutic Exercise)  LAQ (long arc quad), bilateral;marching while seated  -AS     Lower Extremity Range of Motion (Therapeutic Exercise)  ankle dorsiflexion/plantar flexion, bilateral  -AS     Exercise Type (Therapeutic Exercise)  AROM (active range of motion)  -AS     Position (Therapeutic Exercise)  seated  -AS     Sets/Reps (Therapeutic Exercise)  1/10  -AS       User Key  (r) = Recorded By, (t) = Taken By, (c) = Cosigned By    Initials Name Provider Type    AS Najma Marcum PTA Physical Therapy Assistant        Goals/Plan    No documentation.       Clinical Impression     Row Name 08/12/19 0838          Pain Assessment    Additional Documentation   Pain Scale: Numbers Pre/Post-Treatment (Group)  -AS     Row Name 08/12/19 0838          Pain Scale: Numbers Pre/Post-Treatment    Pain Scale: Numbers, Pretreatment  0/10 - no pain  -AS     Pain Scale: Numbers, Post-Treatment  0/10 - no pain  -AS     Row Name 08/12/19 0839          Plan of Care Review    Plan of Care Reviewed With  patient  -AS       User Key  (r) = Recorded By, (t) = Taken By, (c) = Cosigned By    Initials Name Provider Type    AS Najma Marcum PTA Physical Therapy Assistant        Outcome Measures     Row Name 08/12/19 0842          How much help from another person do you currently need...    Turning from your back to your side while in flat bed without using bedrails?  4  -AS     Moving from lying on back to sitting on the side of a flat bed without bedrails?  4  -AS     Moving to and from a bed to a chair (including a wheelchair)?  3  -AS     Standing up from a chair using your arms (e.g., wheelchair, bedside chair)?  3  -AS     Climbing 3-5 steps with a railing?  3  -AS     To walk in hospital room?  3  -AS     AM-PAC 6 Clicks Score (PT)  20  -AS     Row Name 08/12/19 0842          Functional Assessment    Outcome Measure Options  AM-PAC 6 Clicks Basic Mobility (PT)  -AS       User Key  (r) = Recorded By, (t) = Taken By, (c) = Cosigned By    Initials Name Provider Type    AS Najma Marcum PTA Physical Therapy Assistant        Physical Therapy Education     Title: PT OT SLP Therapies (In Progress)     Topic: Physical Therapy (In Progress)     Point: Mobility training (In Progress)     Learning Progress Summary           Patient Acceptance, E, NR by AS at 8/12/2019  8:39 AM    Acceptance, E,TB, VU,NR by SABA at 8/12/2019  2:42 AM    Acceptance, E, NR by AS at 8/9/2019 11:30 AM    Acceptance, E,TB, VU,NR by SABA at 8/9/2019  2:20 AM    Acceptance, E,D, NR by ROSA at 8/8/2019  9:15 AM                   Point: Home exercise program (In Progress)     Learning Progress Summary            Patient Acceptance, E, NR by AS at 8/12/2019  8:39 AM    Acceptance, E,TB, VU,NR by LD at 8/12/2019  2:42 AM    Acceptance, E, NR by AS at 8/9/2019 11:30 AM    Acceptance, E,TB, VU,NR by LD at 8/9/2019  2:20 AM    Acceptance, E,D, NR by BD at 8/8/2019  9:15 AM                   Point: Body mechanics (In Progress)     Learning Progress Summary           Patient Acceptance, E, NR by AS at 8/12/2019  8:39 AM    Acceptance, E,TB, VU,NR by LD at 8/12/2019  2:42 AM    Acceptance, E, NR by AS at 8/9/2019 11:30 AM    Acceptance, E,TB, VU,NR by LD at 8/9/2019  2:20 AM    Acceptance, E,D, NR by BD at 8/8/2019  9:15 AM                   Point: Precautions (In Progress)     Learning Progress Summary           Patient Acceptance, E, NR by AS at 8/12/2019  8:39 AM    Acceptance, E,TB, VU,NR by LD at 8/12/2019  2:42 AM    Acceptance, E, NR by AS at 8/9/2019 11:30 AM    Acceptance, E,TB, VU,NR by LD at 8/9/2019  2:20 AM    Acceptance, E,D, NR by BD at 8/8/2019  9:15 AM                               User Key     Initials Effective Dates Name Provider Type Discipline    AS 06/22/15 -  Najma Marcum PTA Physical Therapy Assistant PT    BD 06/08/18 -  Lashell Brunson PT Physical Therapist PT    LD 07/24/19 -  Amanda Lynch RN Registered Nurse Nurse              PT Recommendation and Plan     Plan of Care Reviewed With: patient  Progress: improving  Outcome Summary: pt. ambulated 90 feet with rolling walker for support, verbal cues to stay inside walker and to increase step length. Distance limited by weakness and fatigue.      Time Calculation:   PT Charges     Row Name 08/12/19 0842             Time Calculation    Start Time  0802  -AS      PT Received On  08/12/19  -AS      PT Goal Re-Cert Due Date  08/18/19  -AS        User Key  (r) = Recorded By, (t) = Taken By, (c) = Cosigned By    Initials Name Provider Type    AS Najma Marcum PTA Physical Therapy Assistant        Therapy Charges for Today     Code  Description Service Date Service Provider Modifiers Qty    56940667327 HC GAIT TRAINING EA 15 MIN 8/12/2019 Najma Marcum, PTA GP 1    17149014388 HC PT THER PROC EA 15 MIN 8/12/2019 Najma Marcum, CATHERINE GP 1          PT G-Codes  Outcome Measure Options: AM-PAC 6 Clicks Basic Mobility (PT)  AM-PAC 6 Clicks Score (PT): 20  AM-PAC 6 Clicks Score (OT): 19  Modified [place] Scale: 4 - Moderately severe disability.  Unable to walk without assistance, and unable to attend to own bodily needs without assistance.    Najma Marcum PTA  8/12/2019

## 2019-08-12 NOTE — DISCHARGE SUMMARY
[facility] Medicine Services  DISCHARGE SUMMARY    Patient Name: Natasha Flores  : 1950  MRN: [id number]    Date of Admission: 2019  Date of Discharge:  2019  Primary Care Physician: Jeffrey Ca MD    Consults     Date and Time Order Name Status Description    2019 0722 Inpatient Neurosurgery Consult Completed     2019 0030 Inpatient Neurology Consult Stroke Completed           Hospital Course     Presenting Problem:   Cerebellar stroke (CMS/HCC) [I63.9]    Active Hospital Problems    Diagnosis  POA   • **Cerebellar stroke (CMS/HCC) [I63.9]  Yes   • Memory loss [R41.3]  Yes   • Generalized anxiety disorder [F41.1]  Yes   • Depression [F32.9]  Yes   • PAD (peripheral artery disease) (CMS/HCC) [I73.9]  Yes   • Essential hypertension [I10]  Yes   • History of alcohol dependence (CMS/HCC) [F10.21]  Yes   • History of tobacco abuse [Z87.891]  Not Applicable      Resolved Hospital Problems    Diagnosis Date Resolved POA   • Leukocytosis [D72.829] 2019 Yes          Hospital Course:  Natasha Flores is a 69 y.o. female with PAD, HTN, depression presenting for acute onset of dizziness and N/V.  Imaging revealed acute cerebellar stroke.  She was evaluated by neurology and recommended continued aspirin, plavix and high intensity statin.  She was evaluated by neurosurgery due to bilateral carotid stenosis and will continue medical therapy for now and follow up with Dr. Restrepo in 1 month with repeat carotid duplex at that time.  She will reschedule her appointment with her provider at [facility] for peripheral arterial disease with planned left lower extremity stent.  She will be discharged to complete rehabilitation at [facility].      Discharge Follow Up Recommendations for labs/diagnostics:  Follow up with PCP within 1 week of rehab discharge  Follow up with [facility] 1 month  Follow up with Dr. Restrepo, Neurosurgery 1 month    Day of Discharge     HPI: Feeling OK today.   Was able to work with physical therapy today and walked a little farther than before.    Review of Systems  Gen- No fevers, chills  CV- No chest pain, palpitations  Resp- No cough, dyspnea  GI- No N/V/D, abd pain    Otherwise ROS is negative except as mentioned in the HPI.    Vital Signs:   Temp:  [96.7 °F (35.9 °C)-98.5 °F (36.9 °C)] 98.5 °F (36.9 °C)  Heart Rate:  [67-80] 75  Resp:  [16-18] 18  BP: ()/(52-74) 107/74     Physical Exam:  Constitutional: No acute distress, awake, alert, sitting up in bed  HENT: NCAT, mucous membranes moist  Respiratory: Clear to auscultation bilaterally, respiratory effort normal   Cardiovascular: RRR, no murmurs, rubs, or gallops  Gastrointestinal: Positive bowel sounds, soft, nontender, nondistended  Musculoskeletal: No bilateral ankle edema  Psychiatric: Appropriate affect, cooperative  Neurologic: Cranial Nerves grossly intact to confrontation, speech clear  Skin: No rashes    Pertinent  and/or Most Recent Results     Results from last 7 days   Lab Units 08/11/19  0617 08/08/19  0753 08/07/19  1833   WBC 10*3/mm3 10.41 10.68 15.73*   HEMOGLOBIN g/dL 12.1 12.3 13.4   HEMATOCRIT % 37.0 37.5 41.3   PLATELETS 10*3/mm3 288 290 338   SODIUM mmol/L 139 140 139   POTASSIUM mmol/L 4.1 4.0 4.4   CHLORIDE mmol/L 101 103 104   CO2 mmol/L 25.0 24.0 22.0   BUN mg/dL 15 18 22   CREATININE mg/dL 0.90 0.71 0.85   GLUCOSE mg/dL 93 97 129*   CALCIUM mg/dL 9.2 10.1 9.9     Results from last 7 days   Lab Units 08/08/19  0753 08/07/19  1833   BILIRUBIN mg/dL 0.4 0.3   ALK PHOS U/L 65 69   ALT (SGPT) U/L 8 9   AST (SGOT) U/L 14 17   PROTIME Seconds  --  12.5   INR   --  0.98     Results from last 7 days   Lab Units 08/08/19  0753   CHOLESTEROL mg/dL 175   TRIGLYCERIDES mg/dL 154*   HDL CHOL mg/dL 53     Results from last 7 days   Lab Units 08/08/19  0753 08/07/19  1833   TSH mIU/mL 0.644  --    HEMOGLOBIN A1C % 5.10  --    LACTATE mmol/L  --  1.4       Brief Urine Lab Results  (Last result in the  past 365 days)      Color   Clarity   Blood   Leuk Est   Nitrite   Protein   CREAT   Urine HCG        08/07/19 1818 Yellow Clear Negative Negative Negative Negative               Microbiology Results Abnormal     None          Imaging Results (all)     Procedure Component Value Units Date/Time    CT Angiogram Head With & Without Contrast [[id number]] Collected:  08/07/19 2035     Updated:  08/08/19 0839    Narrative:       CTA Neck, CTA Head, MRI Brain WO     INDICATION:    Sudden onset of dizziness    Head and neck CTA:    Neck -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 There is some minimal intermittent flow within the right vertebral artery distally which may relate to collateral flow. Left vertebral artery patent with at least  moderate atherosclerotic change. The basilar artery is patent. Posterior cerebral arteries not well visualized or assessed but appear to be faintly patent with a threadlike appearance particularly on the right.    There is heavy atherosclerotic change of the common carotid arteries bilaterally, particularly at the carotid bulbs bilaterally. Common carotid arteries and internal carotid arteries do remain patent. There is heavy atherosclerotic change of the petrous  cavernous and supraclinoid internal carotid arteries bilaterally. Middle cerebral arteries patent bilaterally with a somewhat threadlike appearance along with the anterior cerebral arteries.    Brain MRI:  No hemorrhage. Recent/acute stroke involving the inferior medial right cerebellar hemisphere extending into the mid right cerebellar hemisphere posteriorly and slightly laterally. Mild edematous change. No distinct hydrocephalus or midline shift.  Extensive periventricular white matter changes likely reflect sequela of chronic ischemic small vessel disease. No convincing evidence of hemorrhagic transformation at this time. No mass lesion.     This is a preliminary wet read performed by  Juvenal Marquez M.D. Final interpretation will be provided by neuroradiology. Please refer to final interpretation for detailed findings and any further recommendations.    NOTIFICATION: Critical Value/emergent results were called by telephone at the time of interpretation on 8/7/2019 8:32 PM to BLAKE Thomas who verbally acknowledged these results.    Final interpretation by neuroradiology.    CTA of the head and neck:    TECHNIQUE:   CT angiogram of the head and neck with contrast. 3-D postprocessing was performed and reviewed.  Evaluation for a significant carotid arterial stenosis is based on the NASCET criteria. Radiation dose reduction techniques included automated exposure  control or exposure modulation based on body size. Radiation audit for number of CT and nuclear cardiology exams performed in the last year:  0.        NECK: The aortic arch shows prominent atherosclerotic calcification. There is dense calcified atherosclerotic plaque at the origin of the left subclavian artery with a high-grade stenosis of approximately 60-70% diameter stenosis. The right vertebral  artery appears occluded at its origin.    There is scattered calcified atherosclerotic plaque of the left common carotid artery. Dense atherosclerotic plaque is demonstrated in the proximal left internal carotid artery causing a high-grade stenosis. The degree of stenosis is on the order of  70-80% diameter stenosis.    There is calcified atheromatous plaque at the origin of the right internal carotid artery with the degree of stenosis on the order of 50-60% diameter stenosis.    The patient demonstrates a dominant left vertebral. The right vertebral does not contribute to the basilar artery.    Head: The carotid siphons bilaterally show dense calcified plaque. The anterior and middle cerebral arteries demonstrate atherosclerotic irregularity but no convincing evidence of a flow-limiting stenosis. There is symmetric runoff bilaterally in  the  anterior circulation. No obvious aneurysm is identified.    The basilar artery is of normal course and caliber. There is atherosclerotic irregularity of both posterior cerebral arteries without evidence of a flow-limiting stenosis. There is symmetric runoff of the posterior cerebral arteries.    The dural venous sinuses appear patent.      Impression:         1.  High-grade stenosis at the origin of the left subclavian artery on the order of 60-70% diameter stenosis.  2.  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  3.  High-grade stenosis of the left internal carotid artery at its origin on the order of 70-80% diameter stenosis.  4.  Prominent calcified plaque at the origin of the right internal carotid artery with a stenosis on the order of 50-60% diameter stenosis.  5.  Prominent atherosclerotic irregularity of the intracranial arteries without convincing evidence of a flow-limiting stenosis or aneurysm.    MRI of the brain without contrast: 8/7/2019    INDICATION:   Sudden onset of dizziness    TECHNIQUE:   MRI of the brain without contrast.    COMPARISON:    None available.    FINDINGS:  The diffusion sequence demonstrates a large area of restricted diffusion in the inferior aspect of the right cerebellar hemisphere in the vascular distribution of the right PICA. There is no convincing evidence of hemorrhagic transformation.    The ventricles are generous in size. Cortical sulci are correspondingly prominent. There are no extra-axial fluid collections. No significant shift of midline structures. There is minimal mass effect on the right side of the lower fourth ventricle. The  FLAIR sequence demonstrates extensive areas of FLAIR hyperintensity in the periventricular white matter and subcortical white matter. This likely represents microvascular disease in this patient. The brainstem shows normal signal intensity.    The pituitary is within normal limits. The cervicomedullary junction is  normal. The 7th and 8th cranial nerve complexes are within normal limits.    Mastoid air cells are clear. Mild inflammatory change demonstrated in the maxillary sinuses and ethmoid air cells. No acute orbital findings.    IMPRESSION:   1.  Large area of restricted diffusion in the inferior aspect of the right cerebellar hemisphere representing an acute infarct in the vascular distribution of the right PICA. No evidence of hemorrhagic transformation.  2.  Prominent generalized cerebral atrophy and extensive white matter microvascular disease.    Signer Name: Damian Hinson MD   Signed: 8/8/2019 8:37 AM   Workstation Name: [id number]    [facility]    CT Angiogram Neck With & Without Contrast [[id number]] Collected:  08/07/19 2035     Updated:  08/08/19 0839    Narrative:       CTA Neck, CTA Head, MRI Brain WO     INDICATION:    Sudden onset of dizziness    Head and neck CTA:    Neck -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 There is some minimal intermittent flow within the right vertebral artery distally which may relate to collateral flow. Left vertebral artery patent with at least  moderate atherosclerotic change. The basilar artery is patent. Posterior cerebral arteries not well visualized or assessed but appear to be faintly patent with a threadlike appearance particularly on the right.    There is heavy atherosclerotic change of the common carotid arteries bilaterally, particularly at the carotid bulbs bilaterally. Common carotid arteries and internal carotid arteries do remain patent. There is heavy atherosclerotic change of the petrous  cavernous and supraclinoid internal carotid arteries bilaterally. Middle cerebral arteries patent bilaterally with a somewhat threadlike appearance along with the anterior cerebral arteries.    Brain MRI:  No hemorrhage. Recent/acute stroke involving the inferior medial right cerebellar hemisphere extending into  the mid right cerebellar hemisphere posteriorly and slightly laterally. Mild edematous change. No distinct hydrocephalus or midline shift.  Extensive periventricular white matter changes likely reflect sequela of chronic ischemic small vessel disease. No convincing evidence of hemorrhagic transformation at this time. No mass lesion.     This is a preliminary wet read performed by Juvenal Marquez M.D. Final interpretation will be provided by neuroradiology. Please refer to final interpretation for detailed findings and any further recommendations.    NOTIFICATION: Critical Value/emergent results were called by telephone at the time of interpretation on 8/7/2019 8:32 PM to BLAKE Thomas who verbally acknowledged these results.    Final interpretation by neuroradiology.    CTA of the head and neck:    TECHNIQUE:   CT angiogram of the head and neck with contrast. 3-D postprocessing was performed and reviewed.  Evaluation for a significant carotid arterial stenosis is based on the NASCET criteria. Radiation dose reduction techniques included automated exposure  control or exposure modulation based on body size. Radiation audit for number of CT and nuclear cardiology exams performed in the last year:  0.        NECK: The aortic arch shows prominent atherosclerotic calcification. There is dense calcified atherosclerotic plaque at the origin of the left subclavian artery with a high-grade stenosis of approximately 60-70% diameter stenosis. The right vertebral  artery appears occluded at its origin.    There is scattered calcified atherosclerotic plaque of the left common carotid artery. Dense atherosclerotic plaque is demonstrated in the proximal left internal carotid artery causing a high-grade stenosis. The degree of stenosis is on the order of  70-80% diameter stenosis.    There is calcified atheromatous plaque at the origin of the right internal carotid artery with the degree of stenosis on the order of 50-60%  diameter stenosis.    The patient demonstrates a dominant left vertebral. The right vertebral does not contribute to the basilar artery.    Head: The carotid siphons bilaterally show dense calcified plaque. The anterior and middle cerebral arteries demonstrate atherosclerotic irregularity but no convincing evidence of a flow-limiting stenosis. There is symmetric runoff bilaterally in the  anterior circulation. No obvious aneurysm is identified.    The basilar artery is of normal course and caliber. There is atherosclerotic irregularity of both posterior cerebral arteries without evidence of a flow-limiting stenosis. There is symmetric runoff of the posterior cerebral arteries.    The dural venous sinuses appear patent.      Impression:         1.  High-grade stenosis at the origin of the left subclavian artery on the order of 60-70% diameter stenosis.  2.  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  3.  High-grade stenosis of the left internal carotid artery at its origin on the order of 70-80% diameter stenosis.  4.  Prominent calcified plaque at the origin of the right internal carotid artery with a stenosis on the order of 50-60% diameter stenosis.  5.  Prominent atherosclerotic irregularity of the intracranial arteries without convincing evidence of a flow-limiting stenosis or aneurysm.    MRI of the brain without contrast: 8/7/2019    INDICATION:   Sudden onset of dizziness    TECHNIQUE:   MRI of the brain without contrast.    COMPARISON:    None available.    FINDINGS:  The diffusion sequence demonstrates a large area of restricted diffusion in the inferior aspect of the right cerebellar hemisphere in the vascular distribution of the right PICA. There is no convincing evidence of hemorrhagic transformation.    The ventricles are generous in size. Cortical sulci are correspondingly prominent. There are no extra-axial fluid collections. No significant shift of midline structures. There is  minimal mass effect on the right side of the lower fourth ventricle. The  FLAIR sequence demonstrates extensive areas of FLAIR hyperintensity in the periventricular white matter and subcortical white matter. This likely represents microvascular disease in this patient. The brainstem shows normal signal intensity.    The pituitary is within normal limits. The cervicomedullary junction is normal. The 7th and 8th cranial nerve complexes are within normal limits.    Mastoid air cells are clear. Mild inflammatory change demonstrated in the maxillary sinuses and ethmoid air cells. No acute orbital findings.    IMPRESSION:   1.  Large area of restricted diffusion in the inferior aspect of the right cerebellar hemisphere representing an acute infarct in the vascular distribution of the right PICA. No evidence of hemorrhagic transformation.  2.  Prominent generalized cerebral atrophy and extensive white matter microvascular disease.    Signer Name: Damian Hinson MD   Signed: 8/8/2019 8:37 AM   Workstation Name: [id number]    [facility]    MRI Brain Without Contrast [[id number]] Collected:  08/07/19 2035     Updated:  08/08/19 0839    Narrative:       CTA Neck, CTA Head, MRI Brain WO     INDICATION:    Sudden onset of dizziness    Head and neck CTA:    Neck -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 There is some minimal intermittent flow within the right vertebral artery distally which may relate to collateral flow. Left vertebral artery patent with at least  moderate atherosclerotic change. The basilar artery is patent. Posterior cerebral arteries not well visualized or assessed but appear to be faintly patent with a threadlike appearance particularly on the right.    There is heavy atherosclerotic change of the common carotid arteries bilaterally, particularly at the carotid bulbs bilaterally. Common carotid arteries and internal carotid arteries do remain  patent. There is heavy atherosclerotic change of the petrous  cavernous and supraclinoid internal carotid arteries bilaterally. Middle cerebral arteries patent bilaterally with a somewhat threadlike appearance along with the anterior cerebral arteries.    Brain MRI:  No hemorrhage. Recent/acute stroke involving the inferior medial right cerebellar hemisphere extending into the mid right cerebellar hemisphere posteriorly and slightly laterally. Mild edematous change. No distinct hydrocephalus or midline shift.  Extensive periventricular white matter changes likely reflect sequela of chronic ischemic small vessel disease. No convincing evidence of hemorrhagic transformation at this time. No mass lesion.     This is a preliminary wet read performed by Juvenal Marquez M.D. Final interpretation will be provided by neuroradiology. Please refer to final interpretation for detailed findings and any further recommendations.    NOTIFICATION: Critical Value/emergent results were called by telephone at the time of interpretation on 8/7/2019 8:32 PM to BLAKE Thomas who verbally acknowledged these results.    Final interpretation by neuroradiology.    CTA of the head and neck:    TECHNIQUE:   CT angiogram of the head and neck with contrast. 3-D postprocessing was performed and reviewed.  Evaluation for a significant carotid arterial stenosis is based on the NASCET criteria. Radiation dose reduction techniques included automated exposure  control or exposure modulation based on body size. Radiation audit for number of CT and nuclear cardiology exams performed in the last year:  0.        NECK: The aortic arch shows prominent atherosclerotic calcification. There is dense calcified atherosclerotic plaque at the origin of the left subclavian artery with a high-grade stenosis of approximately 60-70% diameter stenosis. The right vertebral  artery appears occluded at its origin.    There is scattered calcified atherosclerotic plaque  of the left common carotid artery. Dense atherosclerotic plaque is demonstrated in the proximal left internal carotid artery causing a high-grade stenosis. The degree of stenosis is on the order of  70-80% diameter stenosis.    There is calcified atheromatous plaque at the origin of the right internal carotid artery with the degree of stenosis on the order of 50-60% diameter stenosis.    The patient demonstrates a dominant left vertebral. The right vertebral does not contribute to the basilar artery.    Head: The carotid siphons bilaterally show dense calcified plaque. The anterior and middle cerebral arteries demonstrate atherosclerotic irregularity but no convincing evidence of a flow-limiting stenosis. There is symmetric runoff bilaterally in the  anterior circulation. No obvious aneurysm is identified.    The basilar artery is of normal course and caliber. There is atherosclerotic irregularity of both posterior cerebral arteries without evidence of a flow-limiting stenosis. There is symmetric runoff of the posterior cerebral arteries.    The dural venous sinuses appear patent.      Impression:         1.  High-grade stenosis at the origin of the left subclavian artery on the order of 60-70% diameter stenosis.  2.  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  3.  High-grade stenosis of the left internal carotid artery at its origin on the order of 70-80% diameter stenosis.  4.  Prominent calcified plaque at the origin of the right internal carotid artery with a stenosis on the order of 50-60% diameter stenosis.  5.  Prominent atherosclerotic irregularity of the intracranial arteries without convincing evidence of a flow-limiting stenosis or aneurysm.    MRI of the brain without contrast: 8/7/2019    INDICATION:   Sudden onset of dizziness    TECHNIQUE:   MRI of the brain without contrast.    COMPARISON:    None available.    FINDINGS:  The diffusion sequence demonstrates a large area of  restricted diffusion in the inferior aspect of the right cerebellar hemisphere in the vascular distribution of the right PICA. There is no convincing evidence of hemorrhagic transformation.    The ventricles are generous in size. Cortical sulci are correspondingly prominent. There are no extra-axial fluid collections. No significant shift of midline structures. There is minimal mass effect on the right side of the lower fourth ventricle. The  FLAIR sequence demonstrates extensive areas of FLAIR hyperintensity in the periventricular white matter and subcortical white matter. This likely represents microvascular disease in this patient. The brainstem shows normal signal intensity.    The pituitary is within normal limits. The cervicomedullary junction is normal. The 7th and 8th cranial nerve complexes are within normal limits.    Mastoid air cells are clear. Mild inflammatory change demonstrated in the maxillary sinuses and ethmoid air cells. No acute orbital findings.    IMPRESSION:   1.  Large area of restricted diffusion in the inferior aspect of the right cerebellar hemisphere representing an acute infarct in the vascular distribution of the right PICA. No evidence of hemorrhagic transformation.  2.  Prominent generalized cerebral atrophy and extensive white matter microvascular disease.    Signer Name: Damian Hinson MD   Signed: 8/8/2019 8:37 AM   Workstation Name: [id number]    [facility]    XR Chest PA & Lateral [[id number]] Collected:  08/07/19 2027     Updated:  08/07/19 2030    Narrative:       CR Chest 2 Vws    INDICATION:    Acute onset of dizziness while sweeping her porch today with associated vomiting.    COMPARISON:    7/6/2017    FINDINGS:   PA and lateral views of the chest.  There is mild cardiomegaly and tortuous descending thoracic aorta. The pulmonary vascularity is normal. The lungs are clear of acute densities. Stable calcified granuloma at the left base         Impression:       Mild cardiomegaly with tortuous descending thoracic aorta. The lungs are clear    Signer Name: Silvia Mayorga MD   Signed: 8/7/2019 8:27 PM   Workstation Name: [email or web address]    [facility]          Results for orders placed during the hospital encounter of 08/07/19   Duplex Carotid Ultrasound CAR    Narrative · Left internal carotid artery stenosis of 0-49%.  · Right internal carotid artery stenosis of 0-49%.  · There is a known occlusion of the right vertebral artery.          Results for orders placed during the hospital encounter of 08/07/19   Duplex Carotid Ultrasound CAR    Narrative · Left internal carotid artery stenosis of 0-49%.  · Right internal carotid artery stenosis of 0-49%.  · There is a known occlusion of the right vertebral artery.          Results for orders placed during the hospital encounter of 08/07/19   Adult Transthoracic Echo Complete W/ Cont if Necessary Per Protocol (With Agitated Saline)    Narrative · Estimated EF = 75%.  · The cardiac valves are anatomically and functionally normal.  · Saline test results are negative.            Discharge Details        Discharge Medications      New Medications      Instructions Start Date   acetaminophen 325 MG tablet  Commonly known as:  TYLENOL   650 mg, Oral, Every 6 Hours PRN      aspirin 325 MG tablet   325 mg, Oral, Daily   Start Date:  8/13/2019     atorvastatin 80 MG tablet  Commonly known as:  LIPITOR   80 mg, Oral, Nightly      bisacodyl 10 MG suppository  Commonly known as:  DULCOLAX   10 mg, Rectal, Daily PRN      melatonin 5 MG tablet tablet   5 mg, Oral, Nightly PRN      polyethylene glycol pack packet  Commonly known as:  MIRALAX   17 g, Oral, Daily      sennosides-docusate sodium 8.6-50 MG tablet  Commonly known as:  SENOKOT-S   2 tablets, Oral, Nightly PRN         Continue These Medications      Instructions Start Date   amLODIPine 5 MG tablet  Commonly known as:  NORVASC   5 mg, Oral,  Daily      clopidogrel 75 MG tablet  Commonly known as:  PLAVIX   75 mg, Oral, Daily      FLUoxetine 10 MG capsule  Commonly known as:  PROzac   20 mg, Oral, Daily      folic acid 1 MG tablet  Commonly known as:  FOLVITE   1 mg, Oral, Daily      losartan 50 MG tablet  Commonly known as:  COZAAR   100 mg, Oral, Daily      THIAMINE PO   100 mg, Oral, PT unsure of dose             Allergies   Allergen Reactions   • Penicillins          Discharge Disposition:  Skilled Nursing Facility (DC - External)    Discharge Diet:  Diet Order   Procedures   • Diet Regular; Cardiac       CODE STATUS:    Code Status and Medical Interventions:   Ordered at: 08/07/19 5863     Level Of Support Discussed With:    Patient     Code Status:    CPR     Medical Interventions (Level of Support Prior to Arrest):    Full         No future appointments.    Additional Instructions for the Follow-ups that You Need to Schedule     Discharge Follow-up with PCP   As directed       Currently Documented PCP:    Jeffrey Ca MD    PCP Phone Number:    [phone]     Follow Up Details:  Within 1 week of rehab discharge         Discharge Follow-up with Specialty: [facility] [place] [facility]; 1 Month   As directed      Specialty:  [facility] [place] [facility]    Follow Up:  1 Month    Follow Up Details:  Stroke follow up         Discharge Follow-up with Specified Provider: Dr. Restrepo, Neurosurgery; 1 Month   As directed      To:  Dr. Restrepo, Neurosurgery    Follow Up:  1 Month    Follow Up Details:  Carotid stenosis               Time Spent on Discharge:  34 minutes    Electronically signed by Lottie Aleman MD, 08/12/19, 12:23 PM.

## 2019-08-12 NOTE — PLAN OF CARE
Problem: Patient Care Overview  Goal: Plan of Care Review  Outcome: Ongoing (interventions implemented as appropriate)   08/12/19 2525   Coping/Psychosocial   Plan of Care Reviewed With patient   Plan of Care Review   Progress improving   OTHER   Outcome Summary pt. ambulated 90 feet with rolling walker for support, verbal cues to stay inside walker and to increase step length. Distance limited by weakness and fatigue.

## 2019-08-12 NOTE — THERAPY TREATMENT NOTE
Acute Care - Occupational Therapy Treatment Note  [facility]     Patient Name: Natasha Flores  : 1950  MRN: [id number]  Today's Date: 2019  Onset of Illness/Injury or Date of Surgery: 19  Date of Referral to OT: 19       Admit Date: 2019       ICD-10-CM ICD-9-CM   1. Cerebellar stroke (CMS/HCC) I63.9 434.91   2. Atherosclerotic occlusive disease I70.90 440.9   3. Essential hypertension I10 401.9   4. Impaired mobility and ADLs Z74.09 799.89     Patient Active Problem List   Diagnosis   • Cerebellar stroke (CMS/HCC)   • Generalized anxiety disorder   • Depression   • PAD (peripheral artery disease) (CMS/HCC)   • Essential hypertension   • History of alcohol dependence (CMS/HCC)   • History of tobacco abuse   • Leukocytosis   • Memory loss     Past Medical History:   Diagnosis Date   • Anxiety    • Anxiety    • Depression    • Hypertension    • PVD (peripheral vascular disease) (CMS/[facility])      Past Surgical History:   Procedure Laterality Date   •  SECTION     • LEG SURGERY      STENT PLACEMENT         Therapy Treatment    Rehabilitation Treatment Summary     Row Name 19 1135             Treatment Time/Intention    Discipline  occupational therapist  -SHORTY      Document Type  therapy note (daily note)  -SHORTY      Subjective Information  complains of;pain  -SHORTY      Mode of Treatment  individual therapy;occupational therapy  -SHORTY      Patient/Family Observations  Pt. sitting up in bed.  No family present, tele.  -SHORTY      Care Plan Review  care plan/treatment goals reviewed;risks/benefits reviewed;current/potential barriers reviewed;patient/other agree to care plan;evaluation/treatment results reviewed  -SHORTY      Therapy Frequency (OT Eval)  daily  -SHORTY      Comment  one episode of dizziness with bending down, but resolved quickly  -SHORTY      Existing Precautions/Restrictions  fall  -SHORTY      Treatment Considerations/Comments  chronic LE pain  -SHORTY      Recorded by [SHORTY] Olga Beach, OT  08/12/19 1200      Row Name 08/12/19 1135             Vital Signs    Pre Systolic BP Rehab  107  -SHORTY      Pre Treatment Diastolic BP  74  -SHORTY      Post Systolic BP Rehab  135  -SHORTY      Post Treatment Diastolic BP  68  -SHORTY      Pretreatment Heart Rate (beats/min)  66  -SHORTY      Posttreatment Heart Rate (beats/min)  71  -SHORTY      Pre SpO2 (%)  96  -SHORTY      O2 Delivery Pre Treatment  room air  -SHORTY      Post SpO2 (%)  98  -SHORTY      O2 Delivery Post Treatment  room air  -SHORTY      Pre Patient Position  Supine  -SHORTY      Intra Patient Position  Standing  -SHORTY      Post Patient Position  Supine  -SHORTY      Recorded by [SHORTY] Olga Beach, OT 08/12/19 1200      Row Name 08/12/19 1135             Cognitive Assessment/Intervention- PT/OT    Affect/Mental Status (Cognitive)  WFL  -SHORTY      Personal Safety Interventions  fall prevention program maintained;gait belt;nonskid shoes/slippers when out of bed  -SHORTY      Recorded by [SHORTY] Olga Beach, OT 08/12/19 1200      Row Name 08/12/19 1135             Safety Issues, Functional Mobility    Impairments Affecting Function (Mobility)  balance;strength  -SHORTY      Recorded by [SHORTY] Olga Beach, OT 08/12/19 1200      Row Name 08/12/19 1135             Bed Mobility Assessment/Treatment    Bed Mobility Assessment/Treatment  supine-sit;sit-supine;scooting/bridging  -SHORTY      [place] Level (Bed Mobility)  independent  -SHORTY      Scooting/Bridging [place] (Bed Mobility)  conditional independence  -SHORTY      Supine-Sit [place] (Bed Mobility)  independent  -SHORTY      Sit-Supine [place] (Bed Mobility)  independent  -SHORTY      Comment (Bed Mobility)  bed placed flat with no rails and pt. completed easily.  -SHORTY      Recorded by [SHORTY] Olga Beach, OT 08/12/19 1200      Row Name 08/12/19 1135             Functional Mobility    Functional Mobility- Ind. Level  contact guard assist  -SHORTY      Functional Mobility- Device  other (see comments) gait belt  -SHORTY      Functional Mobility-Distance  (Feet)  50  -SHORTY      Functional Mobility- Safety Issues  step length decreased;weight-shifting ability decreased  -SHORTY      Recorded by [SHORTY] Olga Beach, OT 08/12/19 1200      Row Name 08/12/19 1135             Transfer Assessment/Treatment    Transfer Assessment/Treatment  sit-stand transfer;stand-sit transfer;toilet transfer  -SHORTY      Recorded by [SHORTY] Olga Beach, OT 08/12/19 1200      Row Name 08/12/19 1135             Sit-Stand Transfer    Sit-Stand [place] (Transfers)  supervision  -SHORTY      Assistive Device (Sit-Stand Transfers)  other (see comments) gait belt  -SHORTY      Recorded by [SHORTY] Olga Beach, OT 08/12/19 1200      Row Name 08/12/19 1135             Stand-Sit Transfer    Stand-Sit [place] (Transfers)  supervision  -SHORTY      Assistive Device (Stand-Sit Transfers)  -- gait belt  -SHORTY      Recorded by [SHORTY] Olga Beach, OT 08/12/19 1200      Row Name 08/12/19 1135             Toilet Transfer    Type (Toilet Transfer)  sit-stand;stand-sit  -SHORTY      [place] Level (Toilet Transfer)  supervision  -SHORTY      Assistive Device (Toilet Transfer)  raised toilet seat  -SHORTY      Recorded by [SHORTY] Olga Beach, OT 08/12/19 1200      Row Name 08/12/19 1135             ADL Assessment/Intervention    73570 - OT Self Care/Mgmt Minutes  4  -SHORTY      Recorded by [SHORTY] Olga Beach, OT 08/12/19 1200      Row Name 08/12/19 1135             Lower Body Dressing Assessment/Training    Lower Body Dressing [place] Level  don;undergarment;doff;socks;independent  -SHORTY      Lower Body Dressing Position  edge of bed sitting  -SHORTY      Recorded by [SHORTY] Olga Beach, OT 08/12/19 1200      Row Name 08/12/19 1135             BADL Safety/Performance    Impairments, BADL Safety/Performance  balance  -SHORTY      Progress in BADL Status  independence level  -SHORTY      Recorded by [SHORTY] Olga Beach, OT 08/12/19 1200      Row Name 08/12/19 1135             Therapeutic Exercise    34533 - OT Therapeutic Exercise  Minutes  7  -SHORTY      35444 - OT Therapeutic Activity Minutes  5  -SHORTY      Recorded by [SHORTY] Olga eBach, OT 08/12/19 1200      Row Name 08/12/19 1135             Therapeutic Exercise    Upper Extremity Range of Motion (Therapeutic Exercise)  shoulder flexion/extension, bilateral;shoulder horizontal abduction/adduction, bilateral;shoulder abduction/adduction, bilateral;elbow flexion/extension, bilateral  -SHORTY      Exercise Type (Therapeutic Exercise)  AROM (active range of motion)  -SHORTY      Position (Therapeutic Exercise)  supine  -SHORTY      Sets/Reps (Therapeutic Exercise)  1/10  -SHORTY      Expected Outcome (Therapeutic Exercise)  improve functional tolerance, self-care activity;improve functional tolerance, household activity  -SHORTY      Recorded by [SHORTY] Olga Beach, OT 08/12/19 1200      Row Name 08/12/19 1135             Dynamic Standing Balance    Level of [place], Reaches Outside Midline (Standing, Dynamic Balance)  contact guard assist up on toes reaching up, reaching down, hip abd, high march.  -SHORTY      Time Able to Maintain Position, Reaches Outside Midline (Standing, Dynamic Balance)  2 to 3 minutes pt. could not stand on one foot without support of sink   -SHORTY      Assistive Device Utilized (Supported Standing, Dynamic Balance)  other (see comments) sink counter  -SHORTY      Recorded by [SHORTY] Olga Beach, OT 08/12/19 1200      Row Name 08/12/19 1135             Positioning and Restraints    Pre-Treatment Position  in bed  -SHORTY      Post Treatment Position  bed  -SHORTY      In Bed  supine;call light within reach;encouraged to call for assist;exit alarm on  -SHORTY      Recorded by [SHORTY] Olga Beach, OT 08/12/19 1200      Row Name 08/12/19 1135             Pain Assessment    Additional Documentation  Pain Scale: Numbers Pre/Post-Treatment (Group)  -SHORTY      Recorded by [SHORTY] Olga Beach, OT 08/12/19 1200      Row Name 08/12/19 1135             Pain Scale: Numbers Pre/Post-Treatment    Pain Scale: Numbers,  Pretreatment  4/10  -SHORTY      Pain Scale: Numbers, Post-Treatment  4/10  -SHORTY      Pain Location - Side  Bilateral  -SHORTY      Pain Location - Orientation  lower  -SHORTY      Pain Location  extremity  -SHORTY      Pain Intervention(s)  Repositioned pt. declined need for pain meds, states chronic  -SHORTY      Recorded by [SHORTY] Olga Beach, OT 08/12/19 1200      Row Name 08/12/19 1135             Plan of Care Review    Plan of Care Reviewed With  patient  -SHORTY      Recorded by [SHORTY] Olga Beach, OT 08/12/19 1200      Row Name 08/12/19 1135             Outcome Summary/Treatment Plan (OT)    Daily Summary of Progress (OT)  progress toward functional goals is good  -SHORTY      Barriers to Overall Progress (OT)  LE pain, decreased balance  -SHORTY      Plan for Continued Treatment (OT)  cont OT POC  -SHORTY      Recorded by [SHORTY] Olga Beach, OT 08/12/19 1200        User Key  (r) = Recorded By, (t) = Taken By, (c) = Cosigned By    Initials Name Effective Dates Discipline    Olga Norman, OT 06/08/18 -  OT           Rehab Goal Summary     Row Name 08/12/19 1135             Transfer Goal 1 (OT)    Progress/Outcome (Transfer Goal 1, OT)  goal not met;discharged from facility  -SHORTY         Bathing Goal 1 (OT)    Progress/Outcomes (Bathing Goal 1, OT)  goal not met;discharged from facility  -SHORTY         Dressing Goal 1 (OT)    Progress/Outcome (Dressing Goal 1, OT)  goal not met;discharged from facility  -SHORTY         Balance Goal 1 (OT)    Progress/Outcomes (Balance Goal 1, OT)  goal not met;discharged from facility  -SHORTY        User Key  (r) = Recorded By, (t) = Taken By, (c) = Cosigned By    Initials Name Provider Type Discipline    Olga Norman, OT Occupational Therapist OT        Occupational Therapy Education     Title: PT OT SLP Therapies (In Progress)     Topic: Occupational Therapy (Done)     Point: ADL training (Done)     Description: Instruct learner(s) on proper safety adaptation and remediation techniques during self care  or transfers.   Instruct in proper use of assistive devices.    Learning Progress Summary           Patient Acceptance, E,TB, VU,NR by SABA at 8/12/2019  2:42 AM    Acceptance, E,D, VU,NR by SAEID at 8/9/2019  3:24 PM    Comment:  Educated on current deficits, safety concerns, discharge planning. Educated on head/eye ex sitting for vestibular deficits.    Acceptance, E,TB, VU,NR by SABA at 8/9/2019  2:20 AM    Acceptance, E,D, VU,NR by SAEID at 8/8/2019 10:31 AM    Comment:  Educated on current deficits, need for assist and OT POC.   Family Acceptance, E,D, VU,NR by SAEID at 8/9/2019  3:24 PM    Comment:  Educated on current deficits, safety concerns, discharge planning. Educated on head/eye ex sitting for vestibular deficits.                   Point: Home exercise program (Done)     Description: Instruct learner(s) on appropriate technique for monitoring, assisting and/or progressing therapeutic exercises/activities.    Learning Progress Summary           Patient Acceptance, E,TB, VU,NR by SABA at 8/12/2019  2:42 AM    Acceptance, E,TB, VU,NR by SABA at 8/9/2019  2:20 AM                   Point: Precautions (Done)     Description: Instruct learner(s) on prescribed precautions during self-care and functional transfers.    Learning Progress Summary           Patient Acceptance, E,D, VU,NR by SHORTY at 8/12/2019 11:35 AM    Comment:  balance exercises sink side, safety with task    Acceptance, E,TB, VU,NR by SABA at 8/12/2019  2:42 AM    Acceptance, E,TB, VU,NR by SABA at 8/9/2019  2:20 AM                   Point: Body mechanics (Done)     Description: Instruct learner(s) on proper positioning and spine alignment during self-care, functional mobility activities and/or exercises.    Learning Progress Summary           Patient Acceptance, E,D, VU,NR by SHORTY at 8/12/2019 11:35 AM    Comment:  balance exercises sink side, safety with task    Acceptance, E,TB, VU,NR by SABA at 8/12/2019  2:42 AM    Acceptance, E,TB, VU,NR by SABA at 8/9/2019  2:20 AM                                User Key     Initials Effective Dates Name Provider Type Discipline    SHORTY 06/08/18 -  Olga Beach, OT Occupational Therapist OT    AN 06/22/15 -  Liliana Odell OT Occupational Therapist OT    LD 07/24/19 -  Amanda Lynch RN Registered Nurse Nurse                OT Recommendation and Plan  Outcome Summary/Treatment Plan (OT)  Daily Summary of Progress (OT): progress toward functional goals is good  Barriers to Overall Progress (OT): LE pain, decreased balance  Plan for Continued Treatment (OT): cont OT POC  Therapy Frequency (OT Eval): daily  Daily Summary of Progress (OT): progress toward functional goals is good  Plan of Care Review  Plan of Care Reviewed With: patient  Plan of Care Reviewed With: patient  Outcome Summary: Pt. good progress with ADL task completion.  Working on improving balance.  Pt. needs support of sink or other with any task requiring reaching up or down or one foot stance.  Outcome Measures     Row Name 08/12/19 9234             How much help from another is currently needed...    Putting on and taking off regular lower body clothing?  3 assist or supervision for all with balance in standing.  -SHORTY      Bathing (including washing, rinsing, and drying)  3  -SHORTY      Toileting (which includes using toilet bed pan or urinal)  3  -SHORTY      Putting on and taking off regular upper body clothing  4  -SHORTY      Taking care of personal grooming (such as brushing teeth)  3  -SHORTY      Eating meals  4  -SHORTY      AM-PAC 6 Clicks Score (OT)  20  -SHORTY         Modified [place] Scale    Modified [place] Scale  3 - Moderate disability.  Requiring some help, but able to walk without assistance.  -SHORTY        User Key  (r) = Recorded By, (t) = Taken By, (c) = Cosigned By    Initials Name Provider Type    SHORTY Olga Beach, OT Occupational Therapist           Time Calculation:   Time Calculation- OT     Row Name 08/12/19 1135             Time Calculation- OT    OT Start Time  1135  -SHORTY       OT Received On  08/12/19  -SHORTY      OT Goal Re-Cert Due Date  08/18/19  -SHORTY         Timed Charges    93939 - OT Therapeutic Exercise Minutes  7  -SHORTY      06246 - OT Therapeutic Activity Minutes  5  -SHORTY      81763 - OT Self Care/Mgmt Minutes  4  -SHORTY        User Key  (r) = Recorded By, (t) = Taken By, (c) = Cosigned By    Initials Name Provider Type    Olga Norman, OT Occupational Therapist        Therapy Charges for Today     Code Description Service Date Service Provider Modifiers Qty    [id number]  OT THER PROC EA 15 MIN 8/12/2019 Olga Beach OT GO 1               Olga Beach OT  8/12/2019

## 2019-08-12 NOTE — PROGRESS NOTES
"Natasha Flores    Subjective     CC: stroke    History of Present Illness     Natasha Flores is followed with stroke. Her dizziness persists, especially with standing.       There have been no other changes in the patient's interval history since my last progress note of 8/9/19.    I have reviewed and confirmed the past family, social and medical history as accurate on 8/9/19.     Review of Systems   Constitutional: Negative.    Respiratory: Negative.    Cardiovascular: Negative.    Gastrointestinal: Positive for vomiting.   Genitourinary: Negative.        Objective     /67 (BP Location: Left arm, Patient Position: Lying)   Pulse 74   Temp 98.2 °F (36.8 °C) (Oral)   Resp 18   Ht 157.5 cm (62.01\")   Wt 63.5 kg (139 lb 15.9 oz)   SpO2 97%   BMI 25.60 kg/m²     Physical Exam   Constitutional: She is oriented to person, place, and time.   Neurological: She is oriented to person, place, and time.   Psychiatric: Her speech is normal.        Neurologic Exam     Mental Status   Oriented to person, place, and time.   Attention: normal.   Speech: speech is normal   Level of consciousness: alert  Knowledge: good.   Normal comprehension.     Cranial Nerves   Cranial nerves II through XII intact.     Motor Exam   Muscle bulk: normal  Overall muscle tone: normalGrips OK bilaterally       Laboratory and radiological testing: ECHO-no cardio-embolic source; CTA's-right vertebral occlusion and 70-80% left ICA stenosis    Assessment/Plan       Natasha Flores is followed with stroke. I think her current treatment is reasonable (ASA/Plavix/statin). I also agree with plans for rehab. I don't have new recommendations myself this morning and will sign off for now.      As part of this visit I reviewed records from the current hospitalization which is incorporated in the HPI.  "

## 2019-09-16 ENCOUNTER — OFFICE VISIT (OUTPATIENT)
Dept: NEUROSURGERY | Facility: CLINIC | Age: 69
End: 2019-09-16

## 2019-09-16 ENCOUNTER — HOSPITAL ENCOUNTER (OUTPATIENT)
Dept: CARDIOLOGY | Facility: HOSPITAL | Age: 69
Discharge: HOME | End: 2019-09-16
Admitting: NEUROLOGICAL SURGERY

## 2019-09-16 VITALS
HEIGHT: 62 IN | BODY MASS INDEX: 27.6 KG/M2 | DIASTOLIC BLOOD PRESSURE: 78 MMHG | WEIGHT: 150 LBS | SYSTOLIC BLOOD PRESSURE: 156 MMHG | TEMPERATURE: 98.6 F

## 2019-09-16 VITALS — HEIGHT: 62 IN | BODY MASS INDEX: 25.58 KG/M2 | WEIGHT: 139 LBS

## 2019-09-16 DIAGNOSIS — I63.9 CEREBELLAR STROKE (HCC): ICD-10-CM

## 2019-09-16 DIAGNOSIS — I65.23 BILATERAL CAROTID ARTERY STENOSIS: ICD-10-CM

## 2019-09-16 DIAGNOSIS — Z87.891 HISTORY OF TOBACCO ABUSE: ICD-10-CM

## 2019-09-16 DIAGNOSIS — I73.9 PAD (PERIPHERAL ARTERY DISEASE) (HCC): ICD-10-CM

## 2019-09-16 DIAGNOSIS — F10.21 HISTORY OF ALCOHOL DEPENDENCE (HCC): ICD-10-CM

## 2019-09-16 DIAGNOSIS — I65.23 BILATERAL CAROTID ARTERY STENOSIS: Primary | ICD-10-CM

## 2019-09-16 PROBLEM — I65.01 VERTEBRAL ARTERY OCCLUSION, RIGHT: Status: ACTIVE | Noted: 2019-08-09

## 2019-09-16 LAB
BH CV XLRA MEAS LEFT DIST CCA EDV: 29.2 CM/SEC
BH CV XLRA MEAS LEFT DIST CCA PSV: 193.1 CM/SEC
BH CV XLRA MEAS LEFT DIST ICA EDV: 14.6 CM/SEC
BH CV XLRA MEAS LEFT DIST ICA PSV: 39.2 CM/SEC
BH CV XLRA MEAS LEFT ICA/CCA RATIO: 2.05
BH CV XLRA MEAS LEFT MID CCA EDV: 27.2 CM/SEC
BH CV XLRA MEAS LEFT MID CCA PSV: 111 CM/SEC
BH CV XLRA MEAS LEFT MID ICA EDV: 36.3 CM/SEC
BH CV XLRA MEAS LEFT MID ICA PSV: 164 CM/SEC
BH CV XLRA MEAS LEFT PROX CCA EDV: 26.5 CM/SEC
BH CV XLRA MEAS LEFT PROX CCA PSV: 126.4 CM/SEC
BH CV XLRA MEAS LEFT PROX ECA EDV: 18.7 CM/SEC
BH CV XLRA MEAS LEFT PROX ECA PSV: 167 CM/SEC
BH CV XLRA MEAS LEFT PROX ICA EDV: 29.5 CM/SEC
BH CV XLRA MEAS LEFT PROX ICA PSV: 227 CM/SEC
BH CV XLRA MEAS LEFT PROX SCLA EDV: 32.6 CM/SEC
BH CV XLRA MEAS LEFT PROX SCLA PSV: 178.5 CM/SEC
BH CV XLRA MEAS LEFT VERTEBRAL A EDV: 13.2 CM/SEC
BH CV XLRA MEAS LEFT VERTEBRAL A PSV: 36.6 CM/SEC
BH CV XLRA MEAS RIGHT DIST CCA EDV: 21.4 CM/SEC
BH CV XLRA MEAS RIGHT DIST CCA PSV: 119.4 CM/SEC
BH CV XLRA MEAS RIGHT DIST ICA EDV: 23.6 CM/SEC
BH CV XLRA MEAS RIGHT DIST ICA PSV: 88.4 CM/SEC
BH CV XLRA MEAS RIGHT ICA/CCA RATIO: 1.12
BH CV XLRA MEAS RIGHT MID CCA EDV: 14.9 CM/SEC
BH CV XLRA MEAS RIGHT MID CCA PSV: 141 CM/SEC
BH CV XLRA MEAS RIGHT MID ICA EDV: 28.3 CM/SEC
BH CV XLRA MEAS RIGHT MID ICA PSV: 98.1 CM/SEC
BH CV XLRA MEAS RIGHT PROX CCA EDV: 17.3 CM/SEC
BH CV XLRA MEAS RIGHT PROX CCA PSV: 117.1 CM/SEC
BH CV XLRA MEAS RIGHT PROX ECA EDV: 26.2 CM/SEC
BH CV XLRA MEAS RIGHT PROX ECA PSV: 182.5 CM/SEC
BH CV XLRA MEAS RIGHT PROX ICA EDV: 34.1 CM/SEC
BH CV XLRA MEAS RIGHT PROX ICA PSV: 158 CM/SEC
BH CV XLRA MEAS RIGHT PROX SCLA EDV: 1.1 CM/SEC
BH CV XLRA MEAS RIGHT PROX SCLA PSV: 189.7 CM/SEC
BH CV XLRA MEAS RIGHT VERTEBRAL A PSV: 26.1 CM/SEC
LEFT ARM BP: NORMAL MMHG
RIGHT ARM BP: NORMAL MMHG

## 2019-09-16 PROCEDURE — 93880 EXTRACRANIAL BILAT STUDY: CPT | Performed by: INTERNAL MEDICINE

## 2019-09-16 PROCEDURE — 93880 EXTRACRANIAL BILAT STUDY: CPT

## 2019-09-16 PROCEDURE — 99214 OFFICE O/P EST MOD 30 MIN: CPT | Performed by: NEUROLOGICAL SURGERY

## 2019-09-16 NOTE — PROGRESS NOTES
Subjective     Chief Complaint: Right cerebellar stroke, right vertebral artery occlusion    Patient ID: Natasha Flores is a 69 y.o. female is here today for follow-up.    History of Present Illness    This is a 69-year-old woman who I saw in consultation in the hospital about a month ago for a right cerebellar stroke.  During her stroke work-up, it was discovered that she had some asymptomatic carotid stenosis.  She was discharged on aspirin and Plavix and follow-up in my clinic was accordingly established.  She was not on aspirin and Plavix at the time of her stroke.     She endorses some occasional unsteadiness on her feet, but she denies any numbness, tingling, weakness, or strokelike symptoms.    At the time of her stroke, the patient's vascular risk factors included tobacco abuse (she stopped approximately 4 months prior to her hospitalization), dyslipidemia, and hypertension.    The following portions of the patient's history were reviewed and updated as appropriate: allergies, current medications, past family history, past medical history, past social history, past surgical history and problem list.    Family history:   Family History   Problem Relation Age of Onset   • Stroke Maternal Grandmother        Social history:   Social History     Socioeconomic History   • Marital status:      Spouse name: Not on file   • Number of children: Not on file   • Years of education: Not on file   • Highest education level: Not on file   Tobacco Use   • Smoking status: Former Smoker     Packs/day: 1.00     Types: Cigarettes   • Smokeless tobacco: Never Used   • Tobacco comment: PT QUIT 1 MONTH AGO    Substance and Sexual Activity   • Alcohol use: No     Comment: PT REPORTS SHE STOPPED DRINKING 2 MONTHS AGO    • Drug use: No   • Sexual activity: Defer       Review of Systems   Constitutional: Positive for activity change, appetite change, fatigue and unexpected weight change. Negative for chills, diaphoresis and  "fever.   HENT: Negative for congestion, dental problem, drooling, ear discharge, ear pain, facial swelling, hearing loss, mouth sores, nosebleeds, postnasal drip, rhinorrhea, sinus pressure, sneezing, sore throat, tinnitus, trouble swallowing and voice change.    Eyes: Positive for photophobia. Negative for pain, discharge, redness, itching and visual disturbance.   Respiratory: Negative for apnea, cough, choking, chest tightness, shortness of breath, wheezing and stridor.    Cardiovascular: Positive for leg swelling. Negative for chest pain and palpitations.   Gastrointestinal: Negative for abdominal distention, abdominal pain, anal bleeding, blood in stool, constipation, diarrhea, nausea, rectal pain and vomiting.   Endocrine: Negative for cold intolerance, heat intolerance, polydipsia, polyphagia and polyuria.   Genitourinary: Negative for decreased urine volume, difficulty urinating, dysuria, enuresis, flank pain, frequency, genital sores, hematuria and urgency.   Musculoskeletal: Positive for gait problem and neck pain. Negative for arthralgias, back pain, joint swelling, myalgias and neck stiffness.   Skin: Negative for color change, pallor, rash and wound.   Allergic/Immunologic: Negative for environmental allergies, food allergies and immunocompromised state.   Neurological: Positive for dizziness, tremors, weakness, light-headedness and headaches. Negative for seizures, syncope, facial asymmetry, speech difficulty and numbness.   Hematological: Negative for adenopathy. Does not bruise/bleed easily.   Psychiatric/Behavioral: Positive for agitation, confusion, decreased concentration, dysphoric mood and sleep disturbance. Negative for behavioral problems, hallucinations, self-injury and suicidal ideas. The patient is nervous/anxious. The patient is not hyperactive.        Objective   Blood pressure 156/78, temperature 98.6 °F (37 °C), temperature source Temporal, height 157.5 cm (62\"), weight 68 kg (150 " lb).  Body mass index is 27.44 kg/m².    Physical Exam   Constitutional: She is oriented to person, place, and time. She appears well-developed and well-nourished.  Non-toxic appearance. No distress.   HENT:   Head: Normocephalic and atraumatic.   Mouth/Throat: Oropharynx is clear and moist.   Eyes: EOM are normal. Pupils are equal, round, and reactive to light. Right eye exhibits no discharge. Left eye exhibits no discharge.   Neck: Phonation normal. No JVD present. No tracheal deviation present. No thyromegaly present.   Cardiovascular: Normal rate and regular rhythm.   Pulses:       Radial pulses are 1+ on the right side, and 1+ on the left side.   Pulmonary/Chest: Effort normal. No stridor. No respiratory distress. She has no wheezes.   Abdominal: Soft. Normal appearance. She exhibits no distension. There is no tenderness.   Musculoskeletal: She exhibits no edema.   Muscle Group     L          R  Deltoid                5          5  Bicep                  5          5  Tricep                 5          5                       5          5  Hand IO              5          5  Hip Flexor           5          5  Knee Extensor   5          5     ADF                    5          5  APF                    5          5      Neurological: She is alert and oriented to person, place, and time. She displays normal reflexes. No cranial nerve deficit or sensory deficit. She exhibits normal muscle tone. She displays a negative Romberg sign. Coordination and gait normal. GCS eye subscore is 4. GCS verbal subscore is 5. GCS motor subscore is 6.   Reflex Scores:       Tricep reflexes are 1+ on the right side and 1+ on the left side.       Bicep reflexes are 1+ on the right side and 1+ on the left side.       Brachioradialis reflexes are 1+ on the right side and 1+ on the left side.       Patellar reflexes are 1+ on the right side and 1+ on the left side.       Achilles reflexes are 1+ on the right side and 1+ on the left  side.  CN II-XII grossly intact.    No pronator drift. FTN testing performed without dysmetria or bradykinesia.   Skin: Skin is warm and dry. She is not diaphoretic. No erythema.   Psychiatric: She has a normal mood and affect. Her speech is normal and behavior is normal. Judgment and thought content normal.   Nursing note and vitals reviewed.        Assessment/Plan     Independent Review of Radiographic Studies:      Available for my review is a MRI of the brain which was performed on 8/7/2019.  This demonstrates a large right cerebellar stroke.  There is no evidence of acute or remote ischemic insults in the anterior circulation.    Available also for my review is a carotid duplex which was performed on 8/9/2018 and a repeat study which was performed on 9/16/2019.  Her carotid duplex today discloses peak systolic velocities of 227 cm/s with a ratio of 2.05 on the left side, and peak systolic velocities in the right internal carotid artery of 158 cm/s with a ratio of 1.1.    A CT angiogram of the head neck was also performed on 8/7/2019.  There is moderate stenosis at the origin of the left subclavian artery, complete occlusion of the right vertebral artery at its origin, calcific stenosis at the origin of the left internal carotid artery measuring 70-80% and 50-60% on the right side.  There is diffuse intracranial atherosclerotic disease present on the CT angiogram of the head.    On the CTA of her neck, the calcific plaque at the origin of the internal carotid arteries bilaterally extends beyond the level of the C2-3 disc space, placing her at increased risk of periprocedural complications with a carotid endarterectomy.    Medical Decision Making:      This is a 69-year-old woman with diffuse peripheral vascular disease and diffuse stenosis of her brachiocephalic tree.  Based on the diffuse nature of her symptoms, and the asymptomatic nature of her carotid stenosis, I am recommending medical management for the  time being.  I directed her to continue with her tobacco cessation, continue on aspirin/Plavix/statin, and I would like to perform a surveillance ultrasound in 6 months.  I reviewed the signs and symptoms of stroke with her, and I directed her to call 911 if she thinks she is having a stroke.    Natasha was seen today for stroke.    Diagnoses and all orders for this visit:    Bilateral carotid artery stenosis  -     Duplex Carotid Ultrasound CAR    Cerebellar stroke (CMS/HCC)    History of tobacco abuse    PAD (peripheral artery disease) (CMS/HCC)    History of alcohol dependence (CMS/HCC)        Return in about 6 months (around 3/16/2020).           This document signed by CRUZ Restrepo MD September 16, 2019 1:39 PM

## 2019-09-23 ENCOUNTER — OFFICE VISIT (OUTPATIENT)
Dept: NEUROLOGY | Facility: CLINIC | Age: 69
End: 2019-09-23

## 2019-09-23 VITALS
DIASTOLIC BLOOD PRESSURE: 78 MMHG | HEART RATE: 83 BPM | SYSTOLIC BLOOD PRESSURE: 116 MMHG | WEIGHT: 152 LBS | BODY MASS INDEX: 27.97 KG/M2 | OXYGEN SATURATION: 97 % | HEIGHT: 62 IN

## 2019-09-23 DIAGNOSIS — I69.30 SEQUELAE, POST-STROKE: Primary | ICD-10-CM

## 2019-09-23 DIAGNOSIS — R41.3 MILD MEMORY DISTURBANCE: ICD-10-CM

## 2019-09-23 PROCEDURE — 99215 OFFICE O/P EST HI 40 MIN: CPT | Performed by: PSYCHIATRY & NEUROLOGY

## 2019-09-23 RX ORDER — ASPIRIN 81 MG/1
81 TABLET, CHEWABLE ORAL DAILY
COMMUNITY

## 2019-09-23 NOTE — PROGRESS NOTES
Subjective:    CC: Natasha Flores is seen today in consultation at the [facility]   for Stroke (Hospital follow up)       HPI:  69-year-old female accompanied by her daughter with a history of hypertension, PVD status post stent, chronic smoker and alcohol abuse in the past recently quit presents as a hospital follow-up for stroke.  As per patient she had symptoms of vertigo with nausea and vomiting on August 7.  She was brought to the hospital where she had a CT angiogram of the head and neck that showed a right vertebral occlusion, left subclavian artery stenosis of about 60 to 70%, left ICA stenosis of 70 to 80% as well as right ICA stenosis of 50 to 60%.  She had an MRI brain that showed an acute infarct in the right cerebellar region and an echocardiogram that showed an EF of 75% with no PFO.  Patient states she had been taking Plavix 1 month prior to the stroke after getting a femoral stent.  She was due to get a stent in her left leg the very same day of her stroke hence her surgery was postponed.  She currently takes both aspirin 81 mg and Plavix along with Lipitor 80 mg.  Her lipid panel was as follows-, , LDL 91, HDL 53.  A1c was 5.1.  .  Prior to her stroke she had been smoking 1 pack of cigarettes a day for the past 25 years and drinking 2-3 drinks of vodka every day for several years.  She has quit cold turkey since the stroke.  She also saw Dr. Restrepo who wants to treat her carotid stenosis with dual antiplatelets and statins.  He will repeat a carotid Doppler in 6 months.  Today the daughter is also concerned about her mother's memory.  She says that she has had memory problems for over a year where she often perseverates asking the same questions again and again.  She can still carry out her activities of daily living.  Denies any family history of dementia.  Patient's 89-year-old mother is very healthy.  Her last TSH at the hospital was normal and B12 level was 443.    The  following portions of the patient's history were reviewed today and updated as of 2019  : allergies, current medications, past family history, past medical history, past social history, past surgical history and problem list  These document will be scanned to patient's chart.      Current Outpatient Medications:   •  acetaminophen (TYLENOL) 325 MG tablet, Take 2 tablets by mouth Every 6 (Six) Hours As Needed for Mild Pain ., Disp: , Rfl:   •  amLODIPine (NORVASC) 5 MG tablet, Take 5 mg by mouth Daily., Disp: , Rfl:   •  aspirin 81 MG chewable tablet, Chew 81 mg Daily., Disp: , Rfl:   •  atorvastatin (LIPITOR) 80 MG tablet, Take 1 tablet by mouth Every Night., Disp: , Rfl:   •  bisacodyl (DULCOLAX) 10 MG suppository, Insert 1 suppository into the rectum Daily As Needed for Constipation., Disp: , Rfl:   •  clopidogrel (PLAVIX) 75 MG tablet, Take 75 mg by mouth Daily., Disp: , Rfl:   •  FLUoxetine (PROzac) 10 MG capsule, Take 20 mg by mouth Daily., Disp: , Rfl:   •  folic acid (FOLVITE) 1 MG tablet, Take 1 mg by mouth Daily., Disp: , Rfl:   •  losartan (COZAAR) 50 MG tablet, Take 100 mg by mouth Daily., Disp: , Rfl:   •  melatonin 5 MG tablet tablet, Take 1 tablet by mouth At Night As Needed (insomnia)., Disp: , Rfl:   •  polyethylene glycol (MIRALAX) pack packet, Take 17 g by mouth Daily., Disp: , Rfl:   •  sennosides-docusate sodium (SENOKOT-S) 8.6-50 MG tablet, Take 2 tablets by mouth At Night As Needed for Constipation., Disp: , Rfl:   •  Thiamine HCl (THIAMINE PO), Take 100 mg by mouth. PT unsure of dose , Disp: , Rfl:    Past Medical History:   Diagnosis Date   • Anxiety    • Anxiety    • Depression    • Hypertension    • PVD (peripheral vascular disease) (CMS/HCC)       Past Surgical History:   Procedure Laterality Date   •  SECTION     • LEG SURGERY      STENT PLACEMENT        Family History   Problem Relation Age of Onset   • Stroke Maternal Grandmother       Social History     Socioeconomic  "History   • Marital status:      Spouse name: Not on file   • Number of children: Not on file   • Years of education: Not on file   • Highest education level: Not on file   Tobacco Use   • Smoking status: Former Smoker     Packs/day: 1.00     Types: Cigarettes   • Smokeless tobacco: Never Used   • Tobacco comment: PT QUIT 1 MONTH AGO    Substance and Sexual Activity   • Alcohol use: No     Comment: PT REPORTS SHE STOPPED DRINKING 2 MONTHS AGO    • Drug use: No   • Sexual activity: Defer     Review of Systems   Respiratory: Positive for shortness of breath.    Musculoskeletal: Positive for gait problem, myalgias and neck pain.   Neurological: Positive for tremors, weakness and confusion.   Psychiatric/Behavioral: Positive for decreased concentration, sleep disturbance and depressed mood. The patient is nervous/anxious.    All other systems reviewed and are negative.      Objective:    /78   Pulse 83   Ht 157.5 cm (62\")   Wt 68.9 kg (152 lb)   SpO2 97%   BMI 27.80 kg/m²     Neurology Exam:    General apperance: NAD.     Mental status: Alert, awake and oriented to time place and person.    Recent and Remote memory: Intact.    Attention span and Concentration: Normal.     Language and Speech: Intact- No dysarthria.    Fluency, Naming , Repitition and Comprehension:  Intact    Cranial Nerves:   CN II: Visual fields are full. Intact. Fundi - Normal, No papillederma, Pupils - MEMO  CN III, IV and VI: Extraocular movements are intact. Normal saccades.   CN V: Facial sensation is intact.   CN VII: Muscles of facial expression reveal no asymmetry. Intact.   CN VIII: Hearing is intact. Whispered voice intact.   CN IX and X: Palate elevates symmetrically. Intact  CN XI: Shoulder shrug is intact.   CN XII: Tongue is midline without evidence of atrophy or fasciculation.     Ophthalmoscopic exam of optic disc-normal    Motor:  Right UE muscle strength 5/5. Normal tone.     Left UE muscle strength 5/5. Normal " tone.      Right LE muscle strength5/5. Normal tone.     Left LE muscle strength 5/5. Normal tone.      Sensory: Normal light touch, vibration and pinprick sensation bilaterally.    DTRs: 2+ bilaterally in upper and lower extremities.    Babinski: Negative bilaterally.    Co-ordination: Normal finger-to-nose, heel to shin B/L.    Rhomberg: Negative.    Gait: Normal.    Cardiovascular: Regular rate and rhythm without murmur, gallop or rub.    Assessment and Plan:  1. Sequelae, post-stroke  Patient had a right cerebellar infarct with multiple vascular risk factors.  She also has significant intra-and extracranial stenosis  I have asked her to continue both aspirin 81 mg and Plavix  For now she should also continue atorvastatin 80 mg for goal LDL of less than 100.  Her LDL was 91  Maintain blood pressure less than 130/90  She should continue both alcohol and tobacco cessation    2. Mild memory disturbance  I will assess her for memory problems at her next visit.  I feel it is too soon to assess her right now as she just had the stroke last month  I have told her to go ahead and take vitamin B12 1000 mcg daily       Return in about 3 months (around 12/23/2019).     I spent over 45  minutes with the patient face to face out of which over 50% (30 minutes) was spent in management, instructions and education regarding secondary stroke risk factors.     Susanne Gomez MD

## 2019-12-16 ENCOUNTER — OFFICE VISIT (OUTPATIENT)
Dept: NEUROLOGY | Facility: CLINIC | Age: 69
End: 2019-12-16

## 2019-12-16 VITALS
HEART RATE: 93 BPM | BODY MASS INDEX: 28.16 KG/M2 | OXYGEN SATURATION: 97 % | HEIGHT: 62 IN | WEIGHT: 153 LBS | DIASTOLIC BLOOD PRESSURE: 80 MMHG | SYSTOLIC BLOOD PRESSURE: 150 MMHG

## 2019-12-16 DIAGNOSIS — R41.3 MEMORY LOSS: ICD-10-CM

## 2019-12-16 DIAGNOSIS — I69.30 SEQUELAE, POST-STROKE: Primary | ICD-10-CM

## 2019-12-16 PROCEDURE — 99215 OFFICE O/P EST HI 40 MIN: CPT | Performed by: PSYCHIATRY & NEUROLOGY

## 2019-12-16 RX ORDER — DONEPEZIL HYDROCHLORIDE 10 MG/1
10 TABLET, FILM COATED ORAL DAILY
Qty: 30 TABLET | Refills: 11 | Status: SHIPPED | OUTPATIENT
Start: 2019-12-16 | End: 2020-09-02 | Stop reason: SDUPTHER

## 2019-12-16 RX ORDER — DONEPEZIL HYDROCHLORIDE 5 MG/1
5 TABLET, FILM COATED ORAL NIGHTLY
Qty: 30 TABLET | Refills: 0 | Status: SHIPPED | OUTPATIENT
Start: 2019-12-16 | End: 2020-02-10

## 2019-12-16 NOTE — PROGRESS NOTES
Subjective:    CC: Natasha Flores is seen today  for Stroke       HPI:  Current visit- since her last visit she has not had any new strokelike symptoms.  In fact her balance has improved significantly.  She continues to take aspirin 81 mg daily and Plavix 75 mg daily in addition to Lipitor 80 mg daily.  She has still not started smoking again and continues to abstain from alcohol.  Did have another femoral stent placed recently.  Her blood pressure is high today however patient states that at home it runs in the 120s/80s.  However her daughter who I spoke to over the phone is not sure if the patient has been recording it every day.  Today her daughter also voices concerns about her memory.  Even before the stroke patient was having problems with her memory which they initially attributed to her heavy alcohol intake.  She could not keep up with her daily activities at home and her personal hygiene and had to be moved in with her mother who is 89 years old.  Currently her mother takes care of most of her activities of daily living such as cooking, paying the bills etc.  Patient has also not been driving since the stroke.  As per the daughter patient also perseverates a lot and asks the same question again and again.  She also mistakes the granddaughter for her daughter at times who is with her today.  Her last TSH was normal and B12 was 443.    Initial [id number] female accompanied by her daughter with a history of hypertension, PVD status post stent, chronic smoker and alcohol abuse in the past recently quit presents as a hospital follow-up for stroke.  As per patient she had symptoms of vertigo with nausea and vomiting on August 7.  She was brought to the hospital where she had a CT angiogram of the head and neck that showed a right vertebral occlusion, left subclavian artery stenosis of about 60 to 70%, left ICA stenosis of 70 to 80% as well as right ICA stenosis of 50 to 60%.  She had an MRI brain that  showed an acute infarct in the right cerebellar region and an echocardiogram that showed an EF of 75% with no PFO.  Patient states she had been taking Plavix 1 month prior to the stroke after getting a femoral stent.  She was due to get a stent in her left leg the very same day of her stroke hence her surgery was postponed.  She currently takes both aspirin 81 mg and Plavix along with Lipitor 80 mg.  Her lipid panel was as follows-, , LDL 91, HDL 53.  A1c was 5.1.  .  Prior to her stroke she had been smoking 1 pack of cigarettes a day for the past 25 years and drinking 2-3 drinks of vodka every day for several years.  She has quit cold turkey since the stroke.  She also saw Dr. Restrepo who wants to treat her carotid stenosis with dual antiplatelets and statins.  He will repeat a carotid Doppler in 6 months.  Today the daughter is also concerned about her mother's memory.  She says that she has had memory problems for over a year where she often perseverates asking the same questions again and again.  She can still carry out her activities of daily living.  Denies any family history of dementia.  Patient's 89-year-old mother is very healthy.  Her last TSH at the hospital was normal and B12 level was 443.    The following portions of the patient's history were reviewed today and updated as of 12/16/2019  : allergies, current medications, past family history, past medical history, past social history, past surgical history and problem list  These document will be scanned to patient's chart.      Current Outpatient Medications:   •  acetaminophen (TYLENOL) 325 MG tablet, Take 2 tablets by mouth Every 6 (Six) Hours As Needed for Mild Pain ., Disp: , Rfl:   •  amLODIPine (NORVASC) 5 MG tablet, Take 5 mg by mouth Daily., Disp: , Rfl:   •  aspirin 81 MG chewable tablet, Chew 81 mg Daily., Disp: , Rfl:   •  atorvastatin (LIPITOR) 80 MG tablet, Take 1 tablet by mouth Every Night., Disp: , Rfl:   •  bisacodyl  (DULCOLAX) 10 MG suppository, Insert 1 suppository into the rectum Daily As Needed for Constipation., Disp: , Rfl:   •  clopidogrel (PLAVIX) 75 MG tablet, Take 75 mg by mouth Daily., Disp: , Rfl:   •  FLUoxetine (PROzac) 10 MG capsule, Take 20 mg by mouth Daily., Disp: , Rfl:   •  folic acid (FOLVITE) 1 MG tablet, Take 1 mg by mouth Daily., Disp: , Rfl:   •  losartan (COZAAR) 50 MG tablet, Take 100 mg by mouth Daily., Disp: , Rfl:   •  melatonin 5 MG tablet tablet, Take 1 tablet by mouth At Night As Needed (insomnia)., Disp: , Rfl:   •  polyethylene glycol (MIRALAX) pack packet, Take 17 g by mouth Daily., Disp: , Rfl:   •  sennosides-docusate sodium (SENOKOT-S) 8.6-50 MG tablet, Take 2 tablets by mouth At Night As Needed for Constipation., Disp: , Rfl:   •  Thiamine HCl (THIAMINE PO), Take 100 mg by mouth. PT unsure of dose , Disp: , Rfl:   •  donepezil (ARICEPT) 10 MG tablet, Take 1 tablet by mouth Daily., Disp: 30 tablet, Rfl: 11  •  donepezil (ARICEPT) 5 MG tablet, Take 1 tablet by mouth Every Night., Disp: 30 tablet, Rfl: 0   Past Medical History:   Diagnosis Date   • Anxiety    • Anxiety    • Depression    • Hypertension    • PVD (peripheral vascular disease) (CMS/HCC)       Past Surgical History:   Procedure Laterality Date   •  SECTION     • LEG SURGERY      STENT PLACEMENT        Family History   Problem Relation Age of Onset   • Stroke Maternal Grandmother       Social History     Socioeconomic History   • Marital status:      Spouse name: Not on file   • Number of children: Not on file   • Years of education: Not on file   • Highest education level: Not on file   Tobacco Use   • Smoking status: Former Smoker     Packs/day: 1.00     Types: Cigarettes   • Smokeless tobacco: Never Used   • Tobacco comment: PT QUIT 1 MONTH AGO    Substance and Sexual Activity   • Alcohol use: No     Comment: PT REPORTS SHE STOPPED DRINKING 2 MONTHS AGO    • Drug use: No   • Sexual activity: Defer     Review of  "Systems   All other systems reviewed and are negative.      Objective:    /80   Pulse 93   Ht 157.5 cm (62\")   Wt 69.4 kg (153 lb)   SpO2 97%   BMI 27.98 kg/m²     Neurology Exam:    General apperance: NAD.     Mental status: Alert, awake and oriented to time place and person.    Recent and Remote memory: Mildly impaired, MMSE today was 24/30 with a delayed recall of 0/3    Attention span and Concentration: Normal.     Language and Speech: Intact- No dysarthria.    Fluency, Naming , Repitition and Comprehension:  Intact    Cranial Nerves:   CN II: Visual fields are full. Intact. Fundi - Normal, No papillederma, Pupils - MEMO  CN III, IV and VI: Extraocular movements are intact. Normal saccades.   CN V: Facial sensation is intact.   CN VII: Muscles of facial expression reveal no asymmetry. Intact.   CN VIII: Hearing is intact. Whispered voice intact.   CN IX and X: Palate elevates symmetrically. Intact  CN XI: Shoulder shrug is intact.   CN XII: Tongue is midline without evidence of atrophy or fasciculation.     Ophthalmoscopic exam of optic disc-normal    Motor:  Right UE muscle strength 5/5. Normal tone.     Left UE muscle strength 5/5. Normal tone.      Right LE muscle strength5/5. Normal tone.     Left LE muscle strength 5/5. Normal tone.      Sensory: Normal light touch, vibration and pinprick sensation bilaterally.    DTRs: 2+ bilaterally in upper and lower extremities.    Babinski: Negative bilaterally.    Co-ordination: Normal finger-to-nose, heel to shin B/L.    Rhomberg: Negative.    Gait: Normal.    Cardiovascular: Regular rate and rhythm without murmur, gallop or rub.    Assessment and Plan:  1. Sequelae, post-stroke  Patient had a right cerebellar infarct with multiple vascular risk factors.  She also has significant intra-and extracranial stenosis (CT angiogram of the head and neck that showed a right vertebral occlusion, left subclavian artery stenosis of about 60 to 70%, left ICA stenosis " of 70 to 80% as well as right ICA stenosis of 50 to 60%.   I have asked her to continue both aspirin 81 mg and Plavix  For now she should also continue atorvastatin 80 mg for goal LDL of less than 100.  Her LDL was 91  Maintain blood pressure less than 130/90.  I have told her to monitor her blood pressure regularly at home    2. Memory loss  MMSE today was 24/30.  I will start her on Aricept gradually increasing to 10 mg at night  I have  also told her to start thiamine 100 mg daily    Return in about 3 months (around 3/16/2020).     I spent over 40 minutes with the patient face to face out of which over 50% (30 minutes) was spent in management, instructions and education regarding her secondary stroke risk factors and memory problems.     Susanne Gomez MD

## 2020-01-01 ENCOUNTER — OFFICE VISIT (OUTPATIENT)
Dept: NEUROLOGY | Facility: CLINIC | Age: 70
End: 2020-01-01

## 2020-01-01 ENCOUNTER — APPOINTMENT (OUTPATIENT)
Dept: CARDIOLOGY | Facility: HOSPITAL | Age: 70
End: 2020-01-01

## 2020-01-01 ENCOUNTER — TELEPHONE (OUTPATIENT)
Dept: NEUROLOGY | Facility: CLINIC | Age: 70
End: 2020-01-01

## 2020-01-01 ENCOUNTER — LAB (OUTPATIENT)
Dept: LAB | Facility: HOSPITAL | Age: 70
End: 2020-01-01

## 2020-01-01 DIAGNOSIS — I69.30 SEQUELAE, POST-STROKE: ICD-10-CM

## 2020-01-01 DIAGNOSIS — I69.30 SEQUELAE, POST-STROKE: Primary | ICD-10-CM

## 2020-01-01 DIAGNOSIS — R41.3 MEMORY LOSS: ICD-10-CM

## 2020-01-01 DIAGNOSIS — I63.89 OTHER CEREBRAL INFARCTION (HCC): Primary | ICD-10-CM

## 2020-01-01 DIAGNOSIS — F03.A0 MILD DEMENTIA (HCC): Primary | ICD-10-CM

## 2020-01-01 LAB
CHOLEST SERPL-MCNC: 155 MG/DL (ref 0–200)
HDLC SERPL-MCNC: 54 MG/DL (ref 40–60)
LDLC SERPL CALC-MCNC: 81 MG/DL (ref 0–100)
LDLC/HDLC SERPL: 1.46 {RATIO}
TRIGL SERPL-MCNC: 112 MG/DL (ref 0–150)
VLDLC SERPL-MCNC: 20 MG/DL (ref 5–40)

## 2020-01-01 PROCEDURE — 80061 LIPID PANEL: CPT

## 2020-01-01 PROCEDURE — 99214 OFFICE O/P EST MOD 30 MIN: CPT | Performed by: PSYCHIATRY & NEUROLOGY

## 2020-01-01 PROCEDURE — 36415 COLL VENOUS BLD VENIPUNCTURE: CPT

## 2020-01-01 RX ORDER — MEMANTINE HYDROCHLORIDE 5 MG/1
TABLET ORAL
Qty: 180 TABLET | Refills: 2 | OUTPATIENT
Start: 2020-01-01

## 2020-01-01 RX ORDER — DONEPEZIL HYDROCHLORIDE 10 MG/1
10 TABLET, FILM COATED ORAL DAILY
Qty: 30 TABLET | Refills: 11 | Status: SHIPPED | OUTPATIENT
Start: 2020-01-01 | End: 2021-12-07

## 2020-01-01 RX ORDER — MEMANTINE HYDROCHLORIDE 10 MG/1
10 TABLET ORAL 2 TIMES DAILY
Qty: 60 TABLET | Refills: 5 | Status: SHIPPED | OUTPATIENT
Start: 2020-01-01 | End: 2021-12-07

## 2020-02-10 RX ORDER — DONEPEZIL HYDROCHLORIDE 5 MG/1
TABLET, FILM COATED ORAL
Qty: 30 TABLET | Refills: 5 | Status: SHIPPED | OUTPATIENT
Start: 2020-02-10 | End: 2020-09-02 | Stop reason: DRUGHIGH

## 2020-05-18 ENCOUNTER — TELEMEDICINE (OUTPATIENT)
Dept: NEUROLOGY | Facility: CLINIC | Age: 70
End: 2020-05-18

## 2020-05-18 DIAGNOSIS — R41.3 MILD MEMORY DISTURBANCE: ICD-10-CM

## 2020-05-18 DIAGNOSIS — I69.30 SEQUELAE, POST-STROKE: Primary | ICD-10-CM

## 2020-05-18 PROCEDURE — 99213 OFFICE O/P EST LOW 20 MIN: CPT | Performed by: PSYCHIATRY & NEUROLOGY

## 2020-05-18 NOTE — PROGRESS NOTES
Subjective:    CC: Natasha Flores is seen today via video visit for stroke and memory loss    HPI:  Current visit- since the last visit patient's daughter feels that patient's memory has been stable since she started taking Aricept now at 10 mg daily.  She is still living with her 89-year-old mother but is now able to carry on some of her activities of daily living such as cooking and cleaning.  She does get agitated at times which she attributes to staying indoors all day long.  She has also been having difficulty sleeping at night.  With regards to her stroke she has not had any new strokelike symptoms.  Has dizziness occasionally.  Continues to take dual antiplatelets along with atorvastatin 10 mg daily.  Had 2 more surgeries on both legs for arterial stenosis but still continues to have mild weakness and pain in the leg.    Last visit-since her last visit she has not had any new strokelike symptoms.  In fact her balance has improved significantly.  She continues to take aspirin 81 mg daily and Plavix 75 mg daily in addition to Lipitor 80 mg daily.  She has still not started smoking again and continues to abstain from alcohol.  Did have another femoral stent placed recently.  Her blood pressure is high today however patient states that at home it runs in the 120s/80s.  However her daughter who I spoke to over the phone is not sure if the patient has been recording it every day.  Today her daughter also voices concerns about her memory.  Even before the stroke patient was having problems with her memory which they initially attributed to her heavy alcohol intake.  She could not keep up with her daily activities at home and her personal hygiene and had to be moved in with her mother who is 89 years old.  Currently her mother takes care of most of her activities of daily living such as cooking, paying the bills etc.  Patient has also not been driving since the stroke.  As per the daughter patient also perseverates a  lot and asks the same question again and again.  She also mistakes the granddaughter for her daughter at times who is with her today.  Her last TSH was normal and B12 was 443.    Initial [id number] female accompanied by her daughter with a history of hypertension, PVD status post stent, chronic smoker and alcohol abuse in the past recently quit presents as a hospital follow-up for stroke.  As per patient she had symptoms of vertigo with nausea and vomiting on August 7.  She was brought to the hospital where she had a CT angiogram of the head and neck that showed a right vertebral occlusion, left subclavian artery stenosis of about 60 to 70%, left ICA stenosis of 70 to 80% as well as right ICA stenosis of 50 to 60%.  She had an MRI brain that showed an acute infarct in the right cerebellar region and an echocardiogram that showed an EF of 75% with no PFO.  Patient states she had been taking Plavix 1 month prior to the stroke after getting a femoral stent.  She was due to get a stent in her left leg the very same day of her stroke hence her surgery was postponed.  She currently takes both aspirin 81 mg and Plavix along with Lipitor 80 mg.  Her lipid panel was as follows-, , LDL 91, HDL 53.  A1c was 5.1.  .  Prior to her stroke she had been smoking 1 pack of cigarettes a day for the past 25 years and drinking 2-3 drinks of vodka every day for several years.  She has quit cold turkey since the stroke.  She also saw Dr. Restrepo who wants to treat her carotid stenosis with dual antiplatelets and statins.  He will repeat a carotid Doppler in 6 months.  Today the daughter is also concerned about her mother's memory.  She says that she has had memory problems for over a year where she often perseverates asking the same questions again and again.  She can still carry out her activities of daily living.  Denies any family history of dementia.  Patient's 89-year-old mother is very healthy.  Her last TSH at the  hospital was normal and B12 level was 443.    The following portions of the patient's history were reviewed today and updated as of 12/16/2019  : allergies, current medications, past family history, past medical history, past social history, past surgical history and problem list  These document will be scanned to patient's chart.      Current Outpatient Medications:   •  acetaminophen (TYLENOL) 325 MG tablet, Take 2 tablets by mouth Every 6 (Six) Hours As Needed for Mild Pain ., Disp: , Rfl:   •  amLODIPine (NORVASC) 5 MG tablet, Take 5 mg by mouth Daily., Disp: , Rfl:   •  aspirin 81 MG chewable tablet, Chew 81 mg Daily., Disp: , Rfl:   •  atorvastatin (LIPITOR) 80 MG tablet, Take 1 tablet by mouth Every Night., Disp: , Rfl:   •  bisacodyl (DULCOLAX) 10 MG suppository, Insert 1 suppository into the rectum Daily As Needed for Constipation., Disp: , Rfl:   •  clopidogrel (PLAVIX) 75 MG tablet, Take 75 mg by mouth Daily., Disp: , Rfl:   •  donepezil (ARICEPT) 10 MG tablet, Take 1 tablet by mouth Daily., Disp: 30 tablet, Rfl: 11  •  donepezil (ARICEPT) 5 MG tablet, TAKE ONE TABLET BY MOUTH EVERY NIGHT AT BEDTIME, Disp: 30 tablet, Rfl: 5  •  FLUoxetine (PROzac) 10 MG capsule, Take 20 mg by mouth Daily., Disp: , Rfl:   •  folic acid (FOLVITE) 1 MG tablet, Take 1 mg by mouth Daily., Disp: , Rfl:   •  losartan (COZAAR) 50 MG tablet, Take 100 mg by mouth Daily., Disp: , Rfl:   •  melatonin 5 MG tablet tablet, Take 1 tablet by mouth At Night As Needed (insomnia)., Disp: , Rfl:   •  polyethylene glycol (MIRALAX) pack packet, Take 17 g by mouth Daily., Disp: , Rfl:   •  sennosides-docusate sodium (SENOKOT-S) 8.6-50 MG tablet, Take 2 tablets by mouth At Night As Needed for Constipation., Disp: , Rfl:   •  Thiamine HCl (THIAMINE PO), Take 100 mg by mouth. PT unsure of dose , Disp: , Rfl:    Past Medical History:   Diagnosis Date   • Anxiety    • Anxiety    • Depression    • Hypertension    • PVD (peripheral vascular disease)  (CMS/[facility])       Past Surgical History:   Procedure Laterality Date   •  SECTION     • LEG SURGERY      STENT PLACEMENT        Family History   Problem Relation Age of Onset   • Stroke Maternal Grandmother       Social History     Socioeconomic History   • Marital status:      Spouse name: Not on file   • Number of children: Not on file   • Years of education: Not on file   • Highest education level: Not on file   Tobacco Use   • Smoking status: Former Smoker     Packs/day: 1.00     Types: Cigarettes   • Smokeless tobacco: Never Used   • Tobacco comment: PT QUIT 1 MONTH AGO    Substance and Sexual Activity   • Alcohol use: No     Comment: PT REPORTS SHE STOPPED DRINKING 2 MONTHS AGO    • Drug use: No   • Sexual activity: Defer     Review of Systems   All other systems reviewed and are negative.      Objective:    There were no vitals taken for this visit.    Neurology Exam:    General apperance: NAD.     Mental status: Alert, awake and oriented to time place and person.    Recent and Remote memory: Mildly impaired, could tell me the month, date of birth and parts of her address today.  MMSE at last visit was 24/30 with a delayed recall of 0/3    Attention span and Concentration: Normal.     Language and Speech: Intact- No dysarthria.    Fluency, Naming , Repitition and Comprehension:  Intact    Cranial Nerves:   CN II: Visual fields are full. Intact. Fundi - Normal, No papillederma, Pupils - MEMO  CN III, IV and VI: Extraocular movements are intact. Normal saccades.   CN V: Facial sensation is intact.   CN VII: Muscles of facial expression reveal no asymmetry. Intact.   CN VIII: Hearing is intact. Whispered voice intact.   CN IX and X: Palate elevates symmetrically. Intact  CN XI: Shoulder shrug is intact.   CN XII: Tongue is midline without evidence of atrophy or fasciculation.       Motor:  Normal    Gait: Normal.      Assessment and Plan:  1. Sequelae, post-stroke  Patient had a right cerebellar  infarct with multiple vascular risk factors.  She also has significant intra-and extracranial stenosis (CT angiogram of the head and neck that showed a right vertebral occlusion, left subclavian artery stenosis of about 60 to 70%, left ICA stenosis of 70 to 80% as well as right ICA stenosis of 50 to 60%.   I have asked her to continue both aspirin 81 mg and Plavix  She should also continue atorvastatin 80 mg for goal LDL of less than 100.  Her LDL was 91  Maintain blood pressure less than 130/90.  Her blood pressure runs within normal limits    2. Memory loss  MMSE-24/30.  -I have asked her to continue Aricept 10 mg daily  She should also start exercising regularly such as walking  -For sleep I have told her to start melatonin 5 to 10 mg at night  -I will defer starting her on anything for her agitation but if it increases may start her on low doses of Seroquel    Return in about 3 months (around 8/18/2020).         Susanne Gomez MD

## 2020-09-02 ENCOUNTER — OFFICE VISIT (OUTPATIENT)
Dept: NEUROLOGY | Facility: CLINIC | Age: 70
End: 2020-09-02

## 2020-09-02 VITALS
OXYGEN SATURATION: 98 % | SYSTOLIC BLOOD PRESSURE: 132 MMHG | HEIGHT: 62 IN | DIASTOLIC BLOOD PRESSURE: 78 MMHG | TEMPERATURE: 98.2 F | HEART RATE: 66 BPM | WEIGHT: 160.4 LBS | RESPIRATION RATE: 20 BRPM | BODY MASS INDEX: 29.52 KG/M2

## 2020-09-02 DIAGNOSIS — I69.30 SEQUELAE, POST-STROKE: Primary | ICD-10-CM

## 2020-09-02 DIAGNOSIS — R41.3 MILD MEMORY DISTURBANCE: ICD-10-CM

## 2020-09-02 DIAGNOSIS — G47.00 INSOMNIA, UNSPECIFIED TYPE: ICD-10-CM

## 2020-09-02 PROCEDURE — 99214 OFFICE O/P EST MOD 30 MIN: CPT | Performed by: PSYCHIATRY & NEUROLOGY

## 2020-09-02 RX ORDER — MEMANTINE HYDROCHLORIDE 5 MG/1
5 TABLET ORAL 2 TIMES DAILY
Qty: 60 TABLET | Refills: 3 | Status: SHIPPED | OUTPATIENT
Start: 2020-09-02 | End: 2020-01-01

## 2020-09-02 RX ORDER — DONEPEZIL HYDROCHLORIDE 10 MG/1
10 TABLET, FILM COATED ORAL DAILY
Qty: 30 TABLET | Refills: 11 | Status: SHIPPED | OUTPATIENT
Start: 2020-09-02 | End: 2020-01-01 | Stop reason: SDUPTHER

## 2020-09-02 NOTE — PROGRESS NOTES
Subjective:    CC: Natasha Flores is seen today  for Stroke and Memory Loss       HPI:  Current visit-  Patient is now living by herself as her mother was moved to the nursing home.  She can  carry out most of her activities of daily living however her bills are paid by her daughter but since the last visit patient's daughter feels that she continues to have mild episodes with confusion where she will leave the electric gas on.  Once or twice she has also posted some herself to remind herself of things.  Continues to take Aricept 10 mg daily.  Has completely quit smoking and drinking alcohol.  With regards to her stroke she continues to take dual antiplatelets in addition to Lipitor 80 mg daily.  Her last carotid Doppler was 1 year ago.  She also had renewed stents put in her legs for PAD.      Last visit-since the last visit patient's daughter feels that patient's memory has been stable since she started taking Aricept now at 10 mg daily.  She is still living with her 89-year-old mother but is now able to carry on some of her activities of daily living such as cooking and cleaning.  She does get agitated at times which she attributes to staying indoors all day long.  She has also been having difficulty sleeping at night.  With regards to her stroke she has not had any new strokelike symptoms.  Has dizziness occasionally.  Continues to take dual antiplatelets along with atorvastatin 10 mg daily.  Had 2 more surgeries on both legs for arterial stenosis but still continues to have mild weakness and pain in the leg.    Last visit-since her last visit she has not had any new strokelike symptoms.  In fact her balance has improved significantly.  She continues to take aspirin 81 mg daily and Plavix 75 mg daily in addition to Lipitor 80 mg daily.  She has still not started smoking again and continues to abstain from alcohol.  Did have another femoral stent placed recently.  Her blood pressure is high today however patient  states that at home it runs in the 120s/80s.  However her daughter who I spoke to over the phone is not sure if the patient has been recording it every day.  Today her daughter also voices concerns about her memory.  Even before the stroke patient was having problems with her memory which they initially attributed to her heavy alcohol intake.  She could not keep up with her daily activities at home and her personal hygiene and had to be moved in with her mother who is 89 years old.  Currently her mother takes care of most of her activities of daily living such as cooking, paying the bills etc.  Patient has also not been driving since the stroke.  As per the daughter patient also perseverates a lot and asks the same question again and again.  She also mistakes the granddaughter for her daughter at times who is with her today.  Her last TSH was normal and B12 was 443.    Initial [id number]old female accompanied by her daughter with a history of hypertension, PVD status post stent, chronic smoker and alcohol abuse in the past recently quit presents as a hospital follow-up for stroke.  As per patient she had symptoms of vertigo with nausea and vomiting on August 7.  She was brought to the hospital where she had a CT angiogram of the head and neck that showed a right vertebral occlusion, left subclavian artery stenosis of about 60 to 70%, left ICA stenosis of 70 to 80% as well as right ICA stenosis of 50 to 60%.  She had an MRI brain that showed an acute infarct in the right cerebellar region and an echocardiogram that showed an EF of 75% with no PFO.  Patient states she had been taking Plavix 1 month prior to the stroke after getting a femoral stent.  She was due to get a stent in her left leg the very same day of her stroke hence her surgery was postponed.  She currently takes both aspirin 81 mg and Plavix along with Lipitor 80 mg.  Her lipid panel was as follows-, , LDL 91, HDL 53.  A1c was 5.1.  .   Prior to her stroke she had been smoking 1 pack of cigarettes a day for the past 25 years and drinking 2-3 drinks of vodka every day for several years.  She has quit cold turkey since the stroke.  She also saw Dr. Restrepo who wants to treat her carotid stenosis with dual antiplatelets and statins.  He will repeat a carotid Doppler in 6 months.  Today the daughter is also concerned about her mother's memory.  She says that she has had memory problems for over a year where she often perseverates asking the same questions again and again.  She can still carry out her activities of daily living.  Denies any family history of dementia.  Patient's 89-year-old mother is very healthy.  Her last TSH at the hospital was normal and B12 level was 443.    The following portions of the patient's history were reviewed today and updated as of 12/16/2019  : allergies, current medications, past family history, past medical history, past social history, past surgical history and problem list  These document will be scanned to patient's chart.      Current Outpatient Medications:   •  amLODIPine (NORVASC) 5 MG tablet, Take 5 mg by mouth Daily., Disp: , Rfl:   •  aspirin 81 MG chewable tablet, Chew 81 mg Daily., Disp: , Rfl:   •  atorvastatin (LIPITOR) 80 MG tablet, Take 1 tablet by mouth Every Night., Disp: , Rfl:   •  clopidogrel (PLAVIX) 75 MG tablet, Take 75 mg by mouth Daily., Disp: , Rfl:   •  donepezil (Aricept) 10 MG tablet, Take 1 tablet by mouth Daily., Disp: 30 tablet, Rfl: 11  •  folic acid (FOLVITE) 1 MG tablet, Take 1 mg by mouth Daily., Disp: , Rfl:   •  losartan (COZAAR) 50 MG tablet, Take 100 mg by mouth Daily., Disp: , Rfl:   •  melatonin 5 MG tablet tablet, Take 1 tablet by mouth At Night As Needed (insomnia)., Disp: , Rfl:   •  acetaminophen (TYLENOL) 325 MG tablet, Take 2 tablets by mouth Every 6 (Six) Hours As Needed for Mild Pain ., Disp: , Rfl:   •  memantine (NAMENDA) 5 MG tablet, Take 1 tablet by mouth 2 (Two)  "Times a Day., Disp: 60 tablet, Rfl: 3   Past Medical History:   Diagnosis Date   • Anxiety    • Anxiety    • Depression    • Hypertension    • PVD (peripheral vascular disease) (CMS/HCC)       Past Surgical History:   Procedure Laterality Date   •  SECTION     • LEG SURGERY      STENT PLACEMENT        Family History   Problem Relation Age of Onset   • Stroke Maternal Grandmother       Social History     Socioeconomic History   • Marital status:      Spouse name: Not on file   • Number of children: Not on file   • Years of education: Not on file   • Highest education level: Not on file   Tobacco Use   • Smoking status: Former Smoker     Packs/day: 1.00     Types: Cigarettes   • Smokeless tobacco: Never Used   • Tobacco comment: PT QUIT 1 MONTH AGO    Substance and Sexual Activity   • Alcohol use: No     Comment: PT REPORTS SHE STOPPED DRINKING 2 MONTHS AGO    • Drug use: No   • Sexual activity: Defer     Review of Systems   Neurological: Positive for memory problem.   Psychiatric/Behavioral: Positive for sleep disturbance.   All other systems reviewed and are negative.      Objective:    /78   Pulse 66   Temp 98.2 °F (36.8 °C) (Temporal)   Resp 20   Ht 157.5 cm (62\")   Wt 72.8 kg (160 lb 6.4 oz)   SpO2 98%   BMI 29.34 kg/m²     Neurology Exam:    General apperance: NAD.     Mental status: Alert, awake and oriented to time place and person.    Recent and Remote memory: Mildly impaired, delayed recall of 1/3 today.  MMSE at previous visit was 24/30 with a delayed recall of 0/3    Attention span and Concentration: Normal.     Language and Speech: Intact- No dysarthria.    Fluency, Naming , Repitition and Comprehension:  Intact    Cranial Nerves:   CN II: Visual fields are full. Intact. Fundi - Normal, No papillederma, Pupils - MEMO  CN III, IV and VI: Extraocular movements are intact. Normal saccades.   CN V: Facial sensation is intact.   CN VII: Muscles of facial expression reveal no " asymmetry. Intact.   CN VIII: Hearing is intact. Whispered voice intact.   CN IX and X: Palate elevates symmetrically. Intact  CN XI: Shoulder shrug is intact.   CN XII: Tongue is midline without evidence of atrophy or fasciculation.     Ophthalmoscopic exam of optic disc-normal    Motor:  Right UE muscle strength 5/5. Normal tone.     Left UE muscle strength 5/5. Normal tone.      Right LE muscle strength5/5. Normal tone.     Left LE muscle strength 5/5. Normal tone.      Sensory: Normal light touch, vibration and pinprick sensation bilaterally.    DTRs: 2+ bilaterally in upper and lower extremities.    Babinski: Negative bilaterally.    Co-ordination: Normal finger-to-nose, heel to shin B/L.    Rhomberg: Negative.    Gait: Normal.    Cardiovascular: Regular rate and rhythm without murmur, gallop or rub.    Assessment and Plan:  1. Sequelae, post-stroke  Patient had a right cerebellar infarct with multiple vascular risk factors.  She also has significant intra-and extracranial stenosis (CT angiogram of the head and neck that showed a right vertebral occlusion, left subclavian artery stenosis of about 60 to 70%, left ICA stenosis of 70 to 80% as well as right ICA stenosis of 50 to 60%.   I have asked her to continue both aspirin 81 mg and Plavix  For now she should also continue atorvastatin 80 mg for goal LDL of less than 100 as she has had multivessel stenosis  I will get a repeat carotid Doppler as the last one was over 1 year ago  Maintain blood pressure less than 130/90.  I have told her to monitor her blood pressure regularly at home    2. Memory loss  MMSE  was 24/30.  I will start her on Namenda 5 mg twice a day in addition to Aricept 10 mg daily    3.  Insomnia  Continues to have insomnia despite using melatonin.  I have told her to speak to her PCP about starting her on low doses of trazodone    Return in about 3 months (around 12/2/2020).         Susanne Gomez MD

## 2020-12-04 NOTE — TELEPHONE ENCOUNTER
Provider: DR. SANTAMARIA  Caller: AUDREY ERWIN  Relationship to Patient: PT'S DAUGHTER   Phone Number: [phone]        Reason for Call: PT'S DAUGHTER SAVI IS CALLING WANTING TO KNOW IF THE APPT WITH DR. SANTAMARIA ON THE 12-7-2020 NEEDS TO BE RESCHEDULED AFTER THE Duplex Carotid Ultrasound THATS SCHEDULED ON THE 12-. PLEASE CALL HER BACK.

## 2020-12-07 PROBLEM — F03.A0 MILD DEMENTIA (HCC): Status: ACTIVE | Noted: 2020-01-01

## 2020-12-07 NOTE — PROGRESS NOTES
Subjective:    CC: Natasha Flores is seen today  for memory problems    HPI:  Current visit-patient states she has been stable in terms of her memory since starting Namenda 5 mg twice a day in addition to Aricept 10 mg daily however as per her daughter she still has episodes of confusion at times like today when she told her daughter that she was calling her mother who lives at a nursing home to check on her however when her daughter went to her phone there were no calls made.  Patient denies having any visual or auditory hallucinations.  She continues to live by herself and is able to do some of her activities of daily living including cooking but does need help pain her bills.  Her legs also continue to hurt.  Has not had any new strokelike symptoms.  She is still on dual antiplatelets and Lipitor 80 mg daily.  Her carotid Doppler scheduled for later this month.  Has not had a lipid panel in over a year.      Last visit-patient is now living by herself as her mother was moved to the nursing home.  She can  carry out most of her activities of daily living however her bills are paid by her daughter but since the last visit patient's daughter feels that she continues to have mild episodes with confusion where she will leave the electric gas on.  Once or twice she has also posted some herself to remind herself of things.  Continues to take Aricept 10 mg daily.  Has completely quit smoking and drinking alcohol.  With regards to her stroke she continues to take dual antiplatelets in addition to Lipitor 80 mg daily.  Her last carotid Doppler was 1 year ago.  She also had renewed stents put in her legs for PAD.      Last visit-since the last visit patient's daughter feels that patient's memory has been stable since she started taking Aricept now at 10 mg daily.  She is still living with her 89-year-old mother but is now able to carry on some of her activities of daily living such as cooking and cleaning.  She does get  agitated at times which she attributes to staying indoors all day long.  She has also been having difficulty sleeping at night.  With regards to her stroke she has not had any new strokelike symptoms.  Has dizziness occasionally.  Continues to take dual antiplatelets along with atorvastatin 10 mg daily.  Had 2 more surgeries on both legs for arterial stenosis but still continues to have mild weakness and pain in the leg.    Last visit-since her last visit she has not had any new strokelike symptoms.  In fact her balance has improved significantly.  She continues to take aspirin 81 mg daily and Plavix 75 mg daily in addition to Lipitor 80 mg daily.  She has still not started smoking again and continues to abstain from alcohol.  Did have another femoral stent placed recently.  Her blood pressure is high today however patient states that at home it runs in the 120s/80s.  However her daughter who I spoke to over the phone is not sure if the patient has been recording it every day.  Today her daughter also voices concerns about her memory.  Even before the stroke patient was having problems with her memory which they initially attributed to her heavy alcohol intake.  She could not keep up with her daily activities at home and her personal hygiene and had to be moved in with her mother who is 89 years old.  Currently her mother takes care of most of her activities of daily living such as cooking, paying the bills etc.  Patient has also not been driving since the stroke.  As per the daughter patient also perseverates a lot and asks the same question again and again.  She also mistakes the granddaughter for her daughter at times who is with her today.  Her last TSH was normal and B12 was 443.    Initial [id number]-old female accompanied by her daughter with a history of hypertension, PVD status post stent, chronic smoker and alcohol abuse in the past recently quit presents as a hospital follow-up for stroke.  As per  patient she had symptoms of vertigo with nausea and vomiting on August 7.  She was brought to the hospital where she had a CT angiogram of the head and neck that showed a right vertebral occlusion, left subclavian artery stenosis of about 60 to 70%, left ICA stenosis of 70 to 80% as well as right ICA stenosis of 50 to 60%.  She had an MRI brain that showed an acute infarct in the right cerebellar region and an echocardiogram that showed an EF of 75% with no PFO.  Patient states she had been taking Plavix 1 month prior to the stroke after getting a femoral stent.  She was due to get a stent in her left leg the very same day of her stroke hence her surgery was postponed.  She currently takes both aspirin 81 mg and Plavix along with Lipitor 80 mg.  Her lipid panel was as follows-, , LDL 91, HDL 53.  A1c was 5.1.  .  Prior to her stroke she had been smoking 1 pack of cigarettes a day for the past 25 years and drinking 2-3 drinks of vodka every day for several years.  She has quit cold turkey since the stroke.  She also saw Dr. Restrepo who wants to treat her carotid stenosis with dual antiplatelets and statins.  He will repeat a carotid Doppler in 6 months.  Today the daughter is also concerned about her mother's memory.  She says that she has had memory problems for over a year where she often perseverates asking the same questions again and again.  She can still carry out her activities of daily living.  Denies any family history of dementia.  Patient's 89-year-old mother is very healthy.  Her last TSH at the hospital was normal and B12 level was 443.    The following portions of the patient's history were reviewed today and updated as of 12/16/2019  : allergies, current medications, past family history, past medical history, past social history, past surgical history and problem list  These document will be scanned to patient's chart.      Current Outpatient Medications:   •  acetaminophen (TYLENOL) 325 MG  tablet, Take 2 tablets by mouth Every 6 (Six) Hours As Needed for Mild Pain ., Disp: , Rfl:   •  amLODIPine (NORVASC) 5 MG tablet, Take 5 mg by mouth Daily., Disp: , Rfl:   •  aspirin 81 MG chewable tablet, Chew 81 mg Daily., Disp: , Rfl:   •  atorvastatin (LIPITOR) 80 MG tablet, Take 1 tablet by mouth Every Night., Disp: , Rfl:   •  clopidogrel (PLAVIX) 75 MG tablet, Take 75 mg by mouth Daily., Disp: , Rfl:   •  donepezil (Aricept) 10 MG tablet, Take 1 tablet by mouth Daily., Disp: 30 tablet, Rfl: 11  •  folic acid (FOLVITE) 1 MG tablet, Take 1 mg by mouth Daily., Disp: , Rfl:   •  losartan (COZAAR) 50 MG tablet, Take 100 mg by mouth Daily., Disp: , Rfl:   •  melatonin 5 MG tablet tablet, Take 1 tablet by mouth At Night As Needed (insomnia)., Disp: , Rfl:   •  memantine (NAMENDA) 10 MG tablet, Take 1 tablet by mouth 2 (Two) Times a Day., Disp: 60 tablet, Rfl: 5   Past Medical History:   Diagnosis Date   • Anxiety    • Anxiety    • Depression    • Hypertension    • PVD (peripheral vascular disease) (CMS/HCC)       Past Surgical History:   Procedure Laterality Date   •  SECTION     • LEG SURGERY      STENT PLACEMENT        Family History   Problem Relation Age of Onset   • Stroke Maternal Grandmother       Social History     Socioeconomic History   • Marital status:      Spouse name: Not on file   • Number of children: Not on file   • Years of education: Not on file   • Highest education level: Not on file   Tobacco Use   • Smoking status: Former Smoker     Packs/day: 1.00     Types: Cigarettes   • Smokeless tobacco: Never Used   • Tobacco comment: PT QUIT 1 MONTH AGO    Substance and Sexual Activity   • Alcohol use: No     Comment: PT REPORTS SHE STOPPED DRINKING 2 MONTHS AGO    • Drug use: No   • Sexual activity: Defer     Review of Systems   Neurological: Positive for memory problem.   Psychiatric/Behavioral: Positive for sleep disturbance.   All other systems reviewed and are  negative.      Objective:    There were no vitals taken for this visit.    Neurology Exam:    General apperance: NAD.     Mental status: Alert, awake and oriented to time place and person.  Could tell me the month, year but not the date.  Could tell me her address partially    Recent and Remote memory: Impaired, delayed recall of 1/3 today.  MMSE at previous visit was 24/30 with a delayed recall of 0/3    Attention span and Concentration: Normal.     Language and Speech: Intact- No dysarthria.    Fluency, Naming , Repitition and Comprehension:  Intact    Cranial Nerves:   CN II: Visual fields are full. Intact. Fundi - Normal, No papillederma, Pupils - MEMO  CN III, IV and VI: Extraocular movements are intact. Normal saccades.   CN V: Facial sensation is intact.   CN VII: Muscles of facial expression reveal no asymmetry. Intact.   CN VIII: Hearing is intact. Whispered voice intact.   CN IX and X: Palate elevates symmetrically. Intact  CN XI: Shoulder shrug is intact.   CN XII: Tongue is midline without evidence of atrophy or fasciculation.     Ophthalmoscopic exam of optic disc-normal    Motor:  Right UE muscle strength 5/5. Normal tone.     Left UE muscle strength 5/5. Normal tone.      Right LE muscle strength5/5. Normal tone.     Left LE muscle strength 5/5. Normal tone.      Sensory: Normal light touch, vibration and pinprick sensation bilaterally.    DTRs: 2+ bilaterally in upper and lower extremities.    Babinski: Negative bilaterally.    Co-ordination: Normal finger-to-nose, heel to shin B/L.    Rhomberg: Negative.    Gait: Normal.    Cardiovascular: Regular rate and rhythm without murmur, gallop or rub.    Assessment and Plan:  1. Sequelae, post-stroke  Patient had a right cerebellar infarct with multiple vascular risk factors.  She also has significant intra-and extracranial stenosis (CT angiogram of the head and neck that showed a right vertebral occlusion, left subclavian artery stenosis of about 60 to  70%, left ICA stenosis of 70 to 80% as well as right ICA stenosis of 50 to 60%.   Repeat carotid Doppler scheduled for later this month   For now I have asked her to continue both aspirin 81 mg , Plavix and atorvastatin 80 mg for goal LDL of less than 100 as she has had multivessel stenosis  I will recheck a lipid panel today and reduce her dose if need be  Maintain blood pressure less than 130/90.  I have told her to monitor her blood pressure regularly at home    2. Memory loss  MMSE  was 24/30.  I will increase her Namenda to 10 mg twice a day.  She should also continue Aricept 10 mg daily  I have told her to continue caring out physical and mental exercises such as reading, solving crossword puzzles etc.    3.  Insomnia  Is sleeping better now.  Has not spoken to her PCP about starting trazodone    Return in about 3 months (around 3/7/2021).         Susanne Gomez MD

## 2021-01-01 ENCOUNTER — TELEMEDICINE (OUTPATIENT)
Dept: NEUROLOGY | Facility: CLINIC | Age: 71
End: 2021-01-01

## 2021-01-01 ENCOUNTER — APPOINTMENT (OUTPATIENT)
Dept: CARDIOLOGY | Facility: HOSPITAL | Age: 71
End: 2021-01-01

## 2021-01-01 ENCOUNTER — TELEPHONE (OUTPATIENT)
Dept: NEUROLOGY | Facility: CLINIC | Age: 71
End: 2021-01-01

## 2021-01-01 ENCOUNTER — APPOINTMENT (OUTPATIENT)
Dept: CT IMAGING | Facility: HOSPITAL | Age: 71
End: 2021-01-01

## 2021-01-01 ENCOUNTER — HOSPITAL ENCOUNTER (OUTPATIENT)
Dept: CARDIOLOGY | Facility: HOSPITAL | Age: 71
Discharge: HOME | End: 2021-03-30
Admitting: PSYCHIATRY & NEUROLOGY

## 2021-01-01 ENCOUNTER — HOSPITAL ENCOUNTER (EMERGENCY)
Facility: HOSPITAL | Age: 71
Discharge: HOME | End: 2021-07-24
Attending: EMERGENCY MEDICINE | Admitting: EMERGENCY MEDICINE

## 2021-01-01 VITALS — HEIGHT: 62 IN | BODY MASS INDEX: 29.44 KG/M2 | WEIGHT: 160 LBS

## 2021-01-01 VITALS
HEART RATE: 92 BPM | OXYGEN SATURATION: 98 % | SYSTOLIC BLOOD PRESSURE: 151 MMHG | HEIGHT: 61 IN | WEIGHT: 150 LBS | DIASTOLIC BLOOD PRESSURE: 84 MMHG | RESPIRATION RATE: 16 BRPM | BODY MASS INDEX: 28.32 KG/M2 | TEMPERATURE: 99.3 F

## 2021-01-01 DIAGNOSIS — I63.89 OTHER CEREBRAL INFARCTION (HCC): ICD-10-CM

## 2021-01-01 DIAGNOSIS — D64.9 ANEMIA, UNSPECIFIED TYPE: ICD-10-CM

## 2021-01-01 DIAGNOSIS — D72.829 LEUKOCYTOSIS, UNSPECIFIED TYPE: ICD-10-CM

## 2021-01-01 DIAGNOSIS — I69.30 SEQUELAE, POST-STROKE: Primary | ICD-10-CM

## 2021-01-01 DIAGNOSIS — R11.2 NON-INTRACTABLE VOMITING WITH NAUSEA, UNSPECIFIED VOMITING TYPE: Primary | ICD-10-CM

## 2021-01-01 DIAGNOSIS — F03.A0 MILD DEMENTIA (HCC): ICD-10-CM

## 2021-01-01 LAB
ALBUMIN SERPL-MCNC: 3.9 G/DL (ref 3.5–5.2)
ALBUMIN/GLOB SERPL: 1.3 G/DL
ALP SERPL-CCNC: 109 U/L (ref 39–117)
ALT SERPL W P-5'-P-CCNC: 5 U/L (ref 1–33)
ANION GAP SERPL CALCULATED.3IONS-SCNC: 12 MMOL/L (ref 5–15)
AST SERPL-CCNC: 14 U/L (ref 1–32)
BACTERIA UR QL AUTO: ABNORMAL /HPF
BASOPHILS # BLD AUTO: 0.11 10*3/MM3 (ref 0–0.2)
BASOPHILS NFR BLD AUTO: 0.8 % (ref 0–1.5)
BH CV XLRA MEAS LEFT DIST CCA EDV: 96 CM/SEC
BH CV XLRA MEAS LEFT DIST CCA PSV: 410 CM/SEC
BH CV XLRA MEAS LEFT DIST ICA EDV: 32.9 CM/SEC
BH CV XLRA MEAS LEFT DIST ICA PSV: 97.2 CM/SEC
BH CV XLRA MEAS LEFT ICA/CCA RATIO: 0.5
BH CV XLRA MEAS LEFT MID CCA EDV: 24.4 CM/SEC
BH CV XLRA MEAS LEFT MID CCA PSV: 88.2 CM/SEC
BH CV XLRA MEAS LEFT MID ICA EDV: 29.7 CM/SEC
BH CV XLRA MEAS LEFT MID ICA PSV: 161 CM/SEC
BH CV XLRA MEAS LEFT PROX CCA EDV: 22.7 CM/SEC
BH CV XLRA MEAS LEFT PROX CCA PSV: 83.8 CM/SEC
BH CV XLRA MEAS LEFT PROX ECA EDV: 14.8 CM/SEC
BH CV XLRA MEAS LEFT PROX ECA PSV: 119 CM/SEC
BH CV XLRA MEAS LEFT PROX ICA EDV: 51 CM/SEC
BH CV XLRA MEAS LEFT PROX ICA PSV: 201 CM/SEC
BH CV XLRA MEAS LEFT PROX SCLA PSV: 177 CM/SEC
BH CV XLRA MEAS LEFT VERTEBRAL A EDV: 13.2 CM/SEC
BH CV XLRA MEAS LEFT VERTEBRAL A PSV: 39.5 CM/SEC
BH CV XLRA MEAS RIGHT DIST CCA EDV: 37.3 CM/SEC
BH CV XLRA MEAS RIGHT DIST CCA PSV: 178 CM/SEC
BH CV XLRA MEAS RIGHT DIST ICA EDV: 38.8 CM/SEC
BH CV XLRA MEAS RIGHT DIST ICA PSV: 122 CM/SEC
BH CV XLRA MEAS RIGHT ICA/CCA RATIO: 1.1
BH CV XLRA MEAS RIGHT MID CCA EDV: 30.4 CM/SEC
BH CV XLRA MEAS RIGHT MID CCA PSV: 171 CM/SEC
BH CV XLRA MEAS RIGHT MID ICA EDV: 33.4 CM/SEC
BH CV XLRA MEAS RIGHT MID ICA PSV: 126 CM/SEC
BH CV XLRA MEAS RIGHT PROX CCA EDV: 25.5 CM/SEC
BH CV XLRA MEAS RIGHT PROX CCA PSV: 137 CM/SEC
BH CV XLRA MEAS RIGHT PROX ECA EDV: 18.9 CM/SEC
BH CV XLRA MEAS RIGHT PROX ECA PSV: 149 CM/SEC
BH CV XLRA MEAS RIGHT PROX ICA EDV: 53.2 CM/SEC
BH CV XLRA MEAS RIGHT PROX ICA PSV: 191 CM/SEC
BH CV XLRA MEAS RIGHT PROX SCLA PSV: 198 CM/SEC
BH CV XLRA MEAS RIGHT VERTEBRAL A EDV: 2.6 CM/SEC
BH CV XLRA MEAS RIGHT VERTEBRAL A PSV: 55 CM/SEC
BILIRUB SERPL-MCNC: 0.3 MG/DL (ref 0–1.2)
BILIRUB UR QL STRIP: NEGATIVE
BUN SERPL-MCNC: 30 MG/DL (ref 8–23)
BUN/CREAT SERPL: 24.4 (ref 7–25)
CALCIUM SPEC-SCNC: 8.9 MG/DL (ref 8.6–10.5)
CHLORIDE SERPL-SCNC: 104 MMOL/L (ref 98–107)
CLARITY UR: CLEAR
CO2 SERPL-SCNC: 22 MMOL/L (ref 22–29)
COLOR UR: YELLOW
CREAT SERPL-MCNC: 1.23 MG/DL (ref 0.57–1)
D-LACTATE SERPL-SCNC: 1.3 MMOL/L (ref 0.5–2)
D-LACTATE SERPL-SCNC: 2.3 MMOL/L (ref 0.5–2)
DEPRECATED RDW RBC AUTO: 42.7 FL (ref 37–54)
EOSINOPHIL # BLD AUTO: 0.31 10*3/MM3 (ref 0–0.4)
EOSINOPHIL NFR BLD AUTO: 2.3 % (ref 0.3–6.2)
ERYTHROCYTE [DISTWIDTH] IN BLOOD BY AUTOMATED COUNT: 13.4 % (ref 12.3–15.4)
GFR SERPL CREATININE-BSD FRML MDRD: 43 ML/MIN/1.73
GLOBULIN UR ELPH-MCNC: 2.9 GM/DL
GLUCOSE SERPL-MCNC: 116 MG/DL (ref 65–99)
GLUCOSE UR STRIP-MCNC: NEGATIVE MG/DL
HCT VFR BLD AUTO: 26.5 % (ref 34–46.6)
HGB BLD-MCNC: 8.5 G/DL (ref 12–15.9)
HGB UR QL STRIP.AUTO: NEGATIVE
HOLD SPECIMEN: NORMAL
IMM GRANULOCYTES # BLD AUTO: 0.06 10*3/MM3 (ref 0–0.05)
IMM GRANULOCYTES NFR BLD AUTO: 0.4 % (ref 0–0.5)
KETONES UR QL STRIP: ABNORMAL
LEFT ARM BP: NORMAL MMHG
LEUKOCYTE ESTERASE UR QL STRIP.AUTO: ABNORMAL
LIPASE SERPL-CCNC: 66 U/L (ref 13–60)
LYMPHOCYTES # BLD AUTO: 2.56 10*3/MM3 (ref 0.7–3.1)
LYMPHOCYTES NFR BLD AUTO: 18.6 % (ref 19.6–45.3)
MCH RBC QN AUTO: 28.5 PG (ref 26.6–33)
MCHC RBC AUTO-ENTMCNC: 32.1 G/DL (ref 31.5–35.7)
MCV RBC AUTO: 88.9 FL (ref 79–97)
MONOCYTES # BLD AUTO: 1.28 10*3/MM3 (ref 0.1–0.9)
MONOCYTES NFR BLD AUTO: 9.3 % (ref 5–12)
NEUTROPHILS NFR BLD AUTO: 68.6 % (ref 42.7–76)
NEUTROPHILS NFR BLD AUTO: 9.42 10*3/MM3 (ref 1.7–7)
NITRITE UR QL STRIP: NEGATIVE
NRBC BLD AUTO-RTO: 0 /100 WBC (ref 0–0.2)
PH UR STRIP.AUTO: 6 [PH] (ref 5–8)
PLATELET # BLD AUTO: 367 10*3/MM3 (ref 140–450)
PMV BLD AUTO: 10.8 FL (ref 6–12)
POTASSIUM SERPL-SCNC: 3.6 MMOL/L (ref 3.5–5.2)
PROT SERPL-MCNC: 6.8 G/DL (ref 6–8.5)
PROT UR QL STRIP: ABNORMAL
RBC # BLD AUTO: 2.98 10*6/MM3 (ref 3.77–5.28)
RBC # UR: ABNORMAL /HPF
REF LAB TEST METHOD: ABNORMAL
RIGHT ARM BP: NORMAL MMHG
SODIUM SERPL-SCNC: 138 MMOL/L (ref 136–145)
SP GR UR STRIP: 1.02 (ref 1–1.03)
SQUAMOUS #/AREA URNS HPF: ABNORMAL /HPF
UROBILINOGEN UR QL STRIP: ABNORMAL
WBC # BLD AUTO: 13.74 10*3/MM3 (ref 3.4–10.8)
WBC UR QL AUTO: ABNORMAL /HPF
WHOLE BLOOD HOLD SPECIMEN: NORMAL

## 2021-01-01 PROCEDURE — 93880 EXTRACRANIAL BILAT STUDY: CPT

## 2021-01-01 PROCEDURE — 83605 ASSAY OF LACTIC ACID: CPT | Performed by: EMERGENCY MEDICINE

## 2021-01-01 PROCEDURE — 99283 EMERGENCY DEPT VISIT LOW MDM: CPT

## 2021-01-01 PROCEDURE — 70450 CT HEAD/BRAIN W/O DYE: CPT

## 2021-01-01 PROCEDURE — 83605 ASSAY OF LACTIC ACID: CPT

## 2021-01-01 PROCEDURE — 93880 EXTRACRANIAL BILAT STUDY: CPT | Performed by: INTERNAL MEDICINE

## 2021-01-01 PROCEDURE — 80053 COMPREHEN METABOLIC PANEL: CPT

## 2021-01-01 PROCEDURE — 81001 URINALYSIS AUTO W/SCOPE: CPT | Performed by: EMERGENCY MEDICINE

## 2021-01-01 PROCEDURE — 99213 OFFICE O/P EST LOW 20 MIN: CPT | Performed by: PSYCHIATRY & NEUROLOGY

## 2021-01-01 PROCEDURE — 25010000002 IOPAMIDOL 61 % SOLUTION: Performed by: EMERGENCY MEDICINE

## 2021-01-01 PROCEDURE — 85025 COMPLETE CBC W/AUTO DIFF WBC: CPT

## 2021-01-01 PROCEDURE — 83690 ASSAY OF LIPASE: CPT

## 2021-01-01 PROCEDURE — 74177 CT ABD & PELVIS W/CONTRAST: CPT

## 2021-01-01 RX ORDER — SODIUM CHLORIDE 9 MG/ML
10 INJECTION INTRAVENOUS AS NEEDED
Status: DISCONTINUED | OUTPATIENT
Start: 2021-01-01 | End: 2021-01-01 | Stop reason: HOSPADM

## 2021-01-01 RX ORDER — ATORVASTATIN CALCIUM 80 MG/1
80 TABLET, FILM COATED ORAL NIGHTLY
Qty: 60 TABLET | Refills: 5 | Status: SHIPPED | OUTPATIENT
Start: 2021-01-01

## 2021-01-01 RX ORDER — ONDANSETRON 4 MG/1
4 TABLET, FILM COATED ORAL EVERY 8 HOURS PRN
Qty: 15 TABLET | Refills: 0 | Status: SHIPPED | OUTPATIENT
Start: 2021-01-01

## 2021-01-01 RX ADMIN — IOPAMIDOL 90 ML: 612 INJECTION, SOLUTION INTRAVENOUS at 06:03

## 2021-01-01 RX ADMIN — SODIUM CHLORIDE 500 ML: 9 INJECTION, SOLUTION INTRAVENOUS at 04:59

## 2021-04-08 NOTE — PROGRESS NOTES
Subjective:    CC: Natasha Flores is seen today via telephone/video visit for memory problems    HPI:  Current visit-as per patient's daughter patient has been having more problems with her cognition including activities of daily living.  She went to her house recently and found it extremely messy.  She also had problems in arranging her medications..  She did increase her Namenda to 10 mg twice a day and also continues to take Aricept 10 mg daily.  Denies having any new strokelike symptoms.  Her repeat carotid Doppler showed minimal stenosis on the left (whereas before it was severe) and moderate stenosis in the right ICA.  Also right vert origin occlusion was noted along with bilateral severe common carotid artery atherosclerosis with severe stenosis of distal left CCA.  Her lipid panel was as follows-, , LDL 81, HDL 54.  She continues to take dual antiplatelets along with Lipitor 40 mg.    Last visit-patient states she has been stable in terms of her memory since starting Namenda 5 mg twice a day in addition to Aricept 10 mg daily however as per her daughter she still has episodes of confusion at times like today when she told her daughter that she was calling her mother who lives at a nursing home to check on her however when her daughter went to her phone there were no calls made.  Patient denies having any visual or auditory hallucinations.  She continues to live by herself and is able to do some of her activities of daily living including cooking but does need help pain her bills.  Her legs also continue to hurt.  Has not had any new strokelike symptoms.  She is still on dual antiplatelets and Lipitor 80 mg daily.  Her carotid Doppler scheduled for later this month.  Has not had a lipid panel in over a year.      Last visit-patient is now living by herself as her mother was moved to the nursing home.  She can  carry out most of her activities of daily living however her bills are paid by her  daughter but since the last visit patient's daughter feels that she continues to have mild episodes with confusion where she will leave the electric gas on.  Once or twice she has also posted some herself to remind herself of things.  Continues to take Aricept 10 mg daily.  Has completely quit smoking and drinking alcohol.  With regards to her stroke she continues to take dual antiplatelets in addition to Lipitor 80 mg daily.  Her last carotid Doppler was 1 year ago.  She also had renewed stents put in her legs for PAD.      Last visit-since the last visit patient's daughter feels that patient's memory has been stable since she started taking Aricept now at 10 mg daily.  She is still living with her 89-year-old mother but is now able to carry on some of her activities of daily living such as cooking and cleaning.  She does get agitated at times which she attributes to staying indoors all day long.  She has also been having difficulty sleeping at night.  With regards to her stroke she has not had any new strokelike symptoms.  Has dizziness occasionally.  Continues to take dual antiplatelets along with atorvastatin 10 mg daily.  Had 2 more surgeries on both legs for arterial stenosis but still continues to have mild weakness and pain in the leg.    Last visit-since her last visit she has not had any new strokelike symptoms.  In fact her balance has improved significantly.  She continues to take aspirin 81 mg daily and Plavix 75 mg daily in addition to Lipitor 80 mg daily.  She has still not started smoking again and continues to abstain from alcohol.  Did have another femoral stent placed recently.  Her blood pressure is high today however patient states that at home it runs in the 120s/80s.  However her daughter who I spoke to over the phone is not sure if the patient has been recording it every day.  Today her daughter also voices concerns about her memory.  Even before the stroke patient was having problems with  her memory which they initially attributed to her heavy alcohol intake.  She could not keep up with her daily activities at home and her personal hygiene and had to be moved in with her mother who is 89 years old.  Currently her mother takes care of most of her activities of daily living such as cooking, paying the bills etc.  Patient has also not been driving since the stroke.  As per the daughter patient also perseverates a lot and asks the same question again and again.  She also mistakes the granddaughter for her daughter at times who is with her today.  Her last TSH was normal and B12 was 443.    Initial ogbrs-69-wfgi-old female accompanied by her daughter with a history of hypertension, PVD status post stent, chronic smoker and alcohol abuse in the past recently quit presents as a hospital follow-up for stroke.  As per patient she had symptoms of vertigo with nausea and vomiting on August 7.  She was brought to the hospital where she had a CT angiogram of the head and neck that showed a right vertebral occlusion, left subclavian artery stenosis of about 60 to 70%, left ICA stenosis of 70 to 80% as well as right ICA stenosis of 50 to 60%.  She had an MRI brain that showed an acute infarct in the right cerebellar region and an echocardiogram that showed an EF of 75% with no PFO.  Patient states she had been taking Plavix 1 month prior to the stroke after getting a femoral stent.  She was due to get a stent in her left leg the very same day of her stroke hence her surgery was postponed.  She currently takes both aspirin 81 mg and Plavix along with Lipitor 80 mg.  Her lipid panel was as follows-, , LDL 91, HDL 53.  A1c was 5.1.  .  Prior to her stroke she had been smoking 1 pack of cigarettes a day for the past 25 years and drinking 2-3 drinks of vodka every day for several years.  She has quit cold turkey since the stroke.  She also saw Dr. Restrepo who wants to treat her carotid stenosis with dual  antiplatelets and statins.  He will repeat a carotid Doppler in 6 months.  Today the daughter is also concerned about her mother's memory.  She says that she has had memory problems for over a year where she often perseverates asking the same questions again and again.  She can still carry out her activities of daily living.  Denies any family history of dementia.  Patient's 89-year-old mother is very healthy.  Her last TSH at the hospital was normal and B12 level was 443.    The following portions of the patient's history were reviewed today and updated as of 2019  : allergies, current medications, past family history, past medical history, past social history, past surgical history and problem list  These document will be scanned to patient's chart.      Current Outpatient Medications:   •  acetaminophen (TYLENOL) 325 MG tablet, Take 2 tablets by mouth Every 6 (Six) Hours As Needed for Mild Pain ., Disp: , Rfl:   •  amLODIPine (NORVASC) 5 MG tablet, Take 5 mg by mouth Daily., Disp: , Rfl:   •  aspirin 81 MG chewable tablet, Chew 81 mg Daily., Disp: , Rfl:   •  atorvastatin (LIPITOR) 80 MG tablet, Take 1 tablet by mouth Every Night., Disp: 60 tablet, Rfl: 5  •  clopidogrel (PLAVIX) 75 MG tablet, Take 75 mg by mouth Daily., Disp: , Rfl:   •  donepezil (Aricept) 10 MG tablet, Take 1 tablet by mouth Daily., Disp: 30 tablet, Rfl: 11  •  folic acid (FOLVITE) 1 MG tablet, Take 1 mg by mouth Daily., Disp: , Rfl:   •  losartan (COZAAR) 50 MG tablet, Take 100 mg by mouth Daily., Disp: , Rfl:   •  melatonin 5 MG tablet tablet, Take 1 tablet by mouth At Night As Needed (insomnia)., Disp: , Rfl:   •  memantine (NAMENDA) 10 MG tablet, Take 1 tablet by mouth 2 (Two) Times a Day., Disp: 60 tablet, Rfl: 5   Past Medical History:   Diagnosis Date   • Anxiety    • Anxiety    • Depression    • Hypertension    • PVD (peripheral vascular disease) (CMS/HCC)       Past Surgical History:   Procedure Laterality Date   •   SECTION     • LEG SURGERY      STENT PLACEMENT        Family History   Problem Relation Age of Onset   • Stroke Maternal Grandmother       Social History     Socioeconomic History   • Marital status:      Spouse name: Not on file   • Number of children: Not on file   • Years of education: Not on file   • Highest education level: Not on file   Tobacco Use   • Smoking status: Former Smoker     Packs/day: 1.00     Types: Cigarettes   • Smokeless tobacco: Never Used   • Tobacco comment: PT QUIT 1 MONTH AGO    Substance and Sexual Activity   • Alcohol use: No     Comment: PT REPORTS SHE STOPPED DRINKING 2 MONTHS AGO    • Drug use: No   • Sexual activity: Defer     Review of Systems   Neurological: Positive for memory problem.   All other systems reviewed and are negative.      Objective:    There were no vitals taken for this visit.    Neurology Exam:    I could do a video visit with her daughter who was at work.  She connected with the patient over the phone and put her on speaker  so that we could talk to each other.  Speech- intact  Mentation-could tell me the month, year and her date of birth but not her address.    Assessment and Plan:  1. Sequelae, post-stroke  Patient had a right cerebellar infarct with multiple vascular risk factors.  She also has significant intra-and extracranial stenosis (CT angiogram of the head and neck that showed a right vertebral occlusion, left subclavian artery stenosis of about 60 to 70%, left ICA stenosis of 70 to 80% as well as right ICA stenosis of 50 to 60%.   Repeat carotid Doppler shows minimal stenosis on the left, moderate stenosis on the right and bilateral CCA atherosclerosis.    For now I have asked her to continue both aspirin 81 mg , Plavix.  She should also increase the dose of atorvastatin back to 80 mg for goal LDL of less than 100 as she has had multivessel stenosis.  Goal LDL of less than 100.  Her LDL was 81  Maintain blood pressure less than 130/90.  I have  told her to monitor her blood pressure regularly at home    2. Memory loss  MMSE  was 24/30.  I have asked her to continue Namenda  10 mg twice a day and Aricept 10 mg daily  I have told her to continue caring out physical and mental exercises such as reading, solving crossword puzzles etc.  Our memory care  Josefina Santana will be contacting the patient's daughter to offer more resources including looking into independent living facilities      Return in about 3 months (around 7/8/2021).         Susanne Gomez MD

## 2021-04-14 NOTE — TELEPHONE ENCOUNTER
Phone call to daughter, Adrianna, to introduce myself and assess social support needs. Patient lives on her own, used to live with her mother who is 98 and served as eye/ears and accountability but she is now residing in a nursing home. Daughters have noticed her home is messy, she's ordering lots of food from [employer] but not eating what she has or it sits in home, she's eating poorly and gained weight, she is not following exercise instructions for managing issues with her legs which was recommended after 3 separate surgeries. Legs cause her pain, can't stand for a log time. Daughter noticed a conversation she said she had with another family member which did not occur. She will keep notes on any further delusions. Patient was very defensive in response to being approached about this; is not aware of her limitations and declines needing help. They feel she needs assisted living/independent living but don't thik they can afford this. Told her I would help connect her to resources to help in the home (caregivers, home safety modifications and ideas for oversight). They have POA in place, daughter Umesh is POA, but have not talked with  on financial planning for care costs, medicaid, etc. I will email them information on resources in the community as well as home safety information, list of long term care communities, and communication approaches. She agreed to keep in touch as needed.

## 2021-07-24 NOTE — ED PROVIDER NOTES
"Subjective   71-year-old female presents for evaluation of nausea and vomiting.  Of note, the patient states that she has a history of prior stroke with no significant residual deficits.  She suffered from the stroke \"a few years ago.\"  She states that 3 nights ago she began experiencing nausea and vomiting.  The episode of vomiting ended on Tuesday evening, and she has not had any recurrent vomiting since that time.  No headaches.  No abdominal pain.  However, she states that when she had her prior stroke her presenting symptoms were vomiting and she was concerned that history could have repeated itself.  As result, she came to the emergency department this morning for evaluation.  Currently, the patient feels well and has no complaints at this time.          Review of Systems   Gastrointestinal: Positive for nausea and vomiting.   All other systems reviewed and are negative.      Past Medical History:   Diagnosis Date   • Anxiety    • Anxiety    • Depression    • Hypertension    • PVD (peripheral vascular disease) (CMS/[facility])        Allergies   Allergen Reactions   • Penicillins Hives       Past Surgical History:   Procedure Laterality Date   •  SECTION     • LEG SURGERY      STENT PLACEMENT         Family History   Problem Relation Age of Onset   • Stroke Maternal Grandmother        Social History     Socioeconomic History   • Marital status:      Spouse name: Not on file   • Number of children: Not on file   • Years of education: Not on file   • Highest education level: Not on file   Tobacco Use   • Smoking status: Former Smoker     Packs/day: 1.00     Types: Cigarettes   • Smokeless tobacco: Never Used   • Tobacco comment: PT QUIT 1 MONTH AGO    Substance and Sexual Activity   • Alcohol use: No     Comment: PT REPORTS SHE STOPPED DRINKING 2 MONTHS AGO    • Drug use: No   • Sexual activity: Defer           Objective   Physical Exam  Vitals and nursing note reviewed.   Constitutional:       General: " She is not in acute distress.     Appearance: She is well-developed. She is not diaphoretic.      Comments: Nontoxic-appearing female   HENT:      Head: Normocephalic and atraumatic.   Eyes:      Pupils: Pupils are equal, round, and reactive to light.   Cardiovascular:      Rate and Rhythm: Normal rate and regular rhythm.      Heart sounds: Normal heart sounds. No murmur heard.   No friction rub. No gallop.    Pulmonary:      Effort: Pulmonary effort is normal. No respiratory distress.      Breath sounds: Normal breath sounds. No wheezing or rales.   Abdominal:      General: Bowel sounds are normal. There is no distension.      Palpations: Abdomen is soft. There is no mass.      Tenderness: There is no abdominal tenderness. There is no guarding or rebound.      Comments: No focal abdominal tenderness, no peritoneal signs, no pain out of proportion to exam   Musculoskeletal:         General: Normal range of motion.      Cervical back: Neck supple.   Skin:     General: Skin is warm and dry.      Findings: No erythema or rash.   Neurological:      Mental Status: She is alert and oriented to person, place, and time.      Comments: Awake, alert, and oriented x3, clear and fluent speech, no ataxia or dysmetria, neurovascularly intact distally in all fours with bounding distal pulses and normal sensation, 5 out of 5 strength in all fours, no cranial nerve deficits noted with cranial nerves II through XII grossly intact   Psychiatric:         Mood and Affect: Mood normal.         Thought Content: Thought content normal.         Judgment: Judgment normal.         Procedures           ED Course  ED Course as of Jul 24 0656   Sat Jul 24, 2021   0654 71-year-old female presents for evaluation of an episode of nausea and vomiting on Tuesday evening.  Of note, the patient states that she has a prior history of stroke with no significant residual deficits.  After her episode of vomiting on Tuesday, she states that she has felt  well; however, she was concerned that she could have potentially had a stroke, prompting her visit to the emergency department this morning.  On arrival, the patient is nontoxic-appearing.  She denies any abdominal pain.  No focal neurological deficits appreciated on exam.  Labs remarkable for mild leukocytosis as well as for mildly elevated lactate.  CT head negative.  CT of abdomen/pelvis suggestive of gastritis/duodenitis.  Upon reevaluation following medications, the patient feels well.  Doubt emergent central, cardiothoracic, or intra-abdominal process at this time.  Prescription for Zofran as needed.  Patient referred to gastroenterology for further outpatient evaluation if she continues to experience persistent symptoms and fails conservative treatment.  Prescription for Zofran as needed.  Counseled regarding dietary modifications.  She will follow-up as directed.  Agreeable with plan and given appropriate strict return precautions.    [DD]      ED Course User Index  [DD] Tam Vincent MD                                   Recent Results (from the past 24 hour(s))   Comprehensive Metabolic Panel    Collection Time: 07/23/21 10:21 PM    Specimen: Blood   Result Value Ref Range    Glucose 116 (H) 65 - 99 mg/dL    BUN 30 (H) 8 - 23 mg/dL    Creatinine 1.23 (H) 0.57 - 1.00 mg/dL    Sodium 138 136 - 145 mmol/L    Potassium 3.6 3.5 - 5.2 mmol/L    Chloride 104 98 - 107 mmol/L    CO2 22.0 22.0 - 29.0 mmol/L    Calcium 8.9 8.6 - 10.5 mg/dL    Total Protein 6.8 6.0 - 8.5 g/dL    Albumin 3.90 3.50 - 5.20 g/dL    ALT (SGPT) 5 1 - 33 U/L    AST (SGOT) 14 1 - 32 U/L    Alkaline Phosphatase 109 39 - 117 U/L    Total Bilirubin 0.3 0.0 - 1.2 mg/dL    eGFR Non African Amer 43 (L) >60 mL/min/1.73    Globulin 2.9 gm/dL    A/G Ratio 1.3 g/dL    BUN/Creatinine Ratio 24.4 7.0 - 25.0    Anion Gap 12.0 5.0 - 15.0 mmol/L   Lipase    Collection Time: 07/23/21 10:21 PM    Specimen: Blood   Result Value Ref Range    Lipase 66 (H)  13 - 60 U/L   Lactic Acid, Plasma    Collection Time: 07/23/21 10:21 PM    Specimen: Blood   Result Value Ref Range    Lactate 2.3 (C) 0.5 - 2.0 mmol/L   Green Top (Gel)    Collection Time: 07/23/21 10:21 PM   Result Value Ref Range    Extra Tube Hold for add-ons.    Lavender Top    Collection Time: 07/23/21 10:21 PM   Result Value Ref Range    Extra Tube hold for add-on    Gold Top - SST    Collection Time: 07/23/21 10:21 PM   Result Value Ref Range    Extra Tube Hold for add-ons.    Gray Top    Collection Time: 07/23/21 10:21 PM   Result Value Ref Range    Extra Tube Hold for add-ons.    CBC Auto Differential    Collection Time: 07/23/21 10:21 PM    Specimen: Blood   Result Value Ref Range    WBC 13.74 (H) 3.40 - 10.80 10*3/mm3    RBC 2.98 (L) 3.77 - 5.28 10*6/mm3    Hemoglobin 8.5 (L) 12.0 - 15.9 g/dL    Hematocrit 26.5 (L) 34.0 - 46.6 %    MCV 88.9 79.0 - 97.0 fL    MCH 28.5 26.6 - 33.0 pg    MCHC 32.1 31.5 - 35.7 g/dL    RDW 13.4 12.3 - 15.4 %    RDW-SD 42.7 37.0 - 54.0 fl    MPV 10.8 6.0 - 12.0 fL    Platelets 367 140 - 450 10*3/mm3    Neutrophil % 68.6 42.7 - 76.0 %    Lymphocyte % 18.6 (L) 19.6 - 45.3 %    Monocyte % 9.3 5.0 - 12.0 %    Eosinophil % 2.3 0.3 - 6.2 %    Basophil % 0.8 0.0 - 1.5 %    Immature Grans % 0.4 0.0 - 0.5 %    Neutrophils, Absolute 9.42 (H) 1.70 - 7.00 10*3/mm3    Lymphocytes, Absolute 2.56 0.70 - 3.10 10*3/mm3    Monocytes, Absolute 1.28 (H) 0.10 - 0.90 10*3/mm3    Eosinophils, Absolute 0.31 0.00 - 0.40 10*3/mm3    Basophils, Absolute 0.11 0.00 - 0.20 10*3/mm3    Immature Grans, Absolute 0.06 (H) 0.00 - 0.05 10*3/mm3    nRBC 0.0 0.0 - 0.2 /100 WBC   STAT Lactic Acid, Reflex    Collection Time: 07/24/21  3:42 AM    Specimen: Blood   Result Value Ref Range    Lactate 1.3 0.5 - 2.0 mmol/L   Urinalysis With Microscopic If Indicated (No Culture) - Urine, Clean Catch    Collection Time: 07/24/21  4:54 AM    Specimen: Urine, Clean Catch   Result Value Ref Range    Color, UA Yellow Yellow,  Straw    Appearance, UA Clear Clear    pH, UA 6.0 5.0 - 8.0    Specific Gravity, UA 1.022 1.001 - 1.030    Glucose, UA Negative Negative    Ketones, UA Trace (A) Negative    Bilirubin, UA Negative Negative    Blood, UA Negative Negative    Protein, UA Trace (A) Negative    Leuk Esterase, UA Small (1+) (A) Negative    Nitrite, UA Negative Negative    Urobilinogen, UA 1.0 E.U./dL 0.2 - 1.0 E.U./dL   Urinalysis, Microscopic Only - Urine, Clean Catch    Collection Time: 07/24/21  4:54 AM    Specimen: Urine, Clean Catch   Result Value Ref Range    RBC, UA 0-2 None Seen, 0-2 /HPF    WBC, UA 13-20 (A) None Seen, 0-2 /HPF    Bacteria, UA None Seen None Seen, Trace /HPF    Squamous Epithelial Cells, UA 7-12 (A) None Seen, 0-2 /HPF    Methodology Manual Light Microscopy      Note: In addition to lab results from this visit, the labs listed above may include labs taken at another facility or during a different encounter within the last 24 hours. Please correlate lab times with ED admission and discharge times for further clarification of the services performed during this visit.    CT Abdomen Pelvis With Contrast   Final Result   1. Exam is concerning for distal gastritis and proximal duodenitis, likely the result of peptic ulcer disease. Consider upper endoscopy for follow-up purposes.   2. Small hiatal hernia.   3. No bowel obstruction. Normal appendix.   4. Mild colonic diverticulosis without acute diverticulitis.   5. Extensive atherosclerotic disease in addition to coronary artery disease.               Signer Name: Micky Crawford MD    Signed: 7/24/2021 6:22 AM    Workstation Name: [facility]     [facility]      CT Head Without Contrast   Final Result   Senescent changes without acute abnormality.      Signer Name: Micky Crawford MD    Signed: 7/24/2021 6:09 AM    Workstation Name: [facility]     [facility]        Vitals:    07/24/21 0500 07/24/21 0530 07/24/21 0615  07/24/21 0630   BP: 157/67 138/72  151/84   Pulse:       Resp:       Temp:       TempSrc:       SpO2: 99% 99% 100% 98%   Weight:       Height:         Medications   Sodium Chloride (PF) 0.9 % 10 mL (has no administration in time range)   sodium chloride 0.9 % bolus 500 mL (0 mL Intravenous Stopped 7/24/21 0622)   iopamidol (ISOVUE-300) 61 % injection 100 mL (90 mL Intravenous Given 7/24/21 0603)     ECG/EMG Results (last 24 hours)     ** No results found for the last 24 hours. **        No orders to display               MDM    Final diagnoses:   Non-intractable vomiting with nausea, unspecified vomiting type   Leukocytosis, unspecified type   Anemia, unspecified type       ED Disposition  ED Disposition     ED Disposition Condition Comment    Discharge Stable           Jeffrey Ca MD  [address] [facility]  [address]  [place] [address]  [phone]    In 1 week      Otf Borrero MD  [address] [facility] [address]  [place] [address]  [phone]      As needed, If symptoms worsen         Medication List      New Prescriptions    ondansetron 4 MG tablet  Commonly known as: ZOFRAN  Take 1 tablet by mouth Every 8 (Eight) Hours As Needed for Nausea.           Where to Get Your Medications      [facility] [address] [place], [place] - [address] [facility] - [phone]  - [phone]  [address] [facility] [address]    Phone: [phone]   · ondansetron 4 MG tablet          Tam Vincent MD  07/24/21 0677